# Patient Record
Sex: MALE | Race: WHITE | Employment: PART TIME | URBAN - METROPOLITAN AREA
[De-identification: names, ages, dates, MRNs, and addresses within clinical notes are randomized per-mention and may not be internally consistent; named-entity substitution may affect disease eponyms.]

---

## 2017-03-01 ENCOUNTER — OFFICE VISIT (OUTPATIENT)
Dept: FAMILY MEDICINE CLINIC | Age: 22
End: 2017-03-01

## 2017-03-01 VITALS
WEIGHT: 157.2 LBS | TEMPERATURE: 98 F | OXYGEN SATURATION: 99 % | BODY MASS INDEX: 22.5 KG/M2 | HEIGHT: 70 IN | HEART RATE: 56 BPM | RESPIRATION RATE: 18 BRPM | SYSTOLIC BLOOD PRESSURE: 110 MMHG | DIASTOLIC BLOOD PRESSURE: 66 MMHG

## 2017-03-01 DIAGNOSIS — F32.2 SEVERE SINGLE CURRENT EPISODE OF MAJOR DEPRESSIVE DISORDER, WITHOUT PSYCHOTIC FEATURES (HCC): ICD-10-CM

## 2017-03-01 DIAGNOSIS — R42 DIZZINESS: ICD-10-CM

## 2017-03-01 DIAGNOSIS — H53.2 DOUBLE VISION: ICD-10-CM

## 2017-03-01 DIAGNOSIS — G43.919 INTRACTABLE MIGRAINE WITHOUT STATUS MIGRAINOSUS, UNSPECIFIED MIGRAINE TYPE: Primary | ICD-10-CM

## 2017-03-01 DIAGNOSIS — Z23 ENCOUNTER FOR IMMUNIZATION: ICD-10-CM

## 2017-03-01 DIAGNOSIS — F41.9 ANXIETY: ICD-10-CM

## 2017-03-01 DIAGNOSIS — M25.562 CHRONIC PAIN OF BOTH KNEES: ICD-10-CM

## 2017-03-01 DIAGNOSIS — M25.561 CHRONIC PAIN OF BOTH KNEES: ICD-10-CM

## 2017-03-01 DIAGNOSIS — G89.29 CHRONIC PAIN OF BOTH KNEES: ICD-10-CM

## 2017-03-01 RX ORDER — TOPIRAMATE 50 MG/1
TABLET, FILM COATED ORAL
Qty: 60 TAB | Refills: 5 | Status: SHIPPED | OUTPATIENT
Start: 2017-03-01 | End: 2017-05-03 | Stop reason: SDUPTHER

## 2017-03-01 NOTE — PROGRESS NOTES
Chief Complaint   Patient presents with    Follow Up Chronic Condition     bilat. knees    Headache     c/o headaches x 2 months or more , more recent now, about qod,starts behind eyes and then whole head hurts       Reviewed Record in preparation for visit and have obtained necessary documentation. Identified pt with two pt identifiers (Name @ )    Health Maintenance Due   Topic    HPV AGE 9Y-26Y (1 of 3 - Male 3 Dose Series)    DTaP/Tdap/Td series (6 - Tdap)    INFLUENZA AGE 9 TO ADULT          1. Have you been to the ER, urgent care clinic since your last visit? Hospitalized since your last visit? No    2. Have you seen or consulted any other health care providers outside of the [address] [place] [address] since your last visit? Include any pap smears or colon screening.  No

## 2017-03-01 NOTE — MR AVS SNAPSHOT
Visit Information Date & Time Provider Department Dept. Phone Encounter #  
 3/1/2017  4:15 PM Trever Ray MD [address] [place] [address] [phone] [id number] Follow-up Instructions Return in about 1 month (around 4/1/2017) for depression. Upcoming Health Maintenance Date Due  
 HPV AGE 9Y-34Y (1 of 3 - Male 3 Dose Series) 4/2/2006 DTaP/Tdap/Td series (6 - Tdap) 4/2/2006 Allergies as of 3/1/2017  Review Complete On: 3/1/2017 By: Trever Ray MD  
  
 Severity Noted Reaction Type Reactions Ibuprofen  11/15/2010    Unknown (comments) PT IS NOT ALLERGIC TO IBUPROFEN Soy  03/12/2013    Itching, Nausea and Vomiting Current Immunizations  Reviewed on 3/12/2013 Name Date DTaP 7/7/1999, 3/26/1997, 1995, 1995, 1995 Hep B Vaccine 1/16/1996, 1995, 1995 Hib 3/26/1997, 1995, 1995, 1995 Influenza Vaccine (Quad) PF 3/1/2017, 11/24/2015 Influenza Vaccine PF 12/16/2013 MMR 7/7/1999, 8/6/1996 Meningococcal (MCV4P) Vaccine 3/12/2013  7:19 PM  
 Poliovirus vaccine 3/26/1997, 1995, 1995, 1995 Varicella Virus Vaccine 8/6/1996 Not reviewed this visit You Were Diagnosed With   
  
 Codes Comments Intractable migraine without status migrainosus, unspecified migraine type    -  Primary ICD-10-CM: H85.077 ICD-9-CM: 346.91 Dizziness     ICD-10-CM: K32 ICD-9-CM: 780.4 Double vision     ICD-10-CM: H53.2 ICD-9-CM: 368.2 Severe single current episode of major depressive disorder, without psychotic features ([facility] [address]     ICD-10-CM: F32.2 ICD-9-CM: 296.23 Anxiety     ICD-10-CM: F41.9 ICD-9-CM: 300.00 Chronic pain of both knees     ICD-10-CM: M25.561, M25.562, G89.29 ICD-9-CM: 719.46, 338.29 Encounter for immunization     ICD-10-CM: V34 ICD-9-CM: V03.89 Vitals BP  
  
  
  
  
  
 110/66 (BP 1 Location: Right arm, BP Patient Position: Sitting) Vitals History BMI and BSA Data Body Mass Index Body Surface Area  
 22.56 kg/m 2 1.88 m 2 Preferred Pharmacy Pharmacy Name Phone [facility] [address] [phone] Your Updated Medication List  
  
   
This list is accurate as of: 3/1/17  5:07 PM.  Always use your most recent med list.  
  
  
  
  
 diclofenac 1 % Gel Commonly known as:  VOLTAREN Apply 4 g to affected area four (4) times daily. topiramate 50 mg tablet Commonly known as:  TOPAMAX Wk1: 1/2 tab AM, Wk2: 1/2 tab AM and 1/2 tab PM, Wk3 1 tab AM 1/2 tab PM, Wk 4 1 tab BID Prescriptions Sent to Pharmacy Refills  
 topiramate (TOPAMAX) 50 mg tablet 5 Sig: Wk1: 1/2 tab AM, Wk2: 1/2 tab AM and 1/2 tab PM, Wk3 1 tab AM 1/2 tab PM, Wk 4 1 tab BID Class: Normal  
 Pharmacy: [id number] Ph #: [phone] We Performed the Following INFLUENZA VIRUS VAC QUAD,SPLIT,PRESV FREE SYRINGE 3/> YRS IM [id number] CPT(R)] REFERRAL TO PSYCHOLOGY [SSX62 Custom] Comments:  
 Please evaluate patient for Major depression. Follow-up Instructions Return in about 1 month (around 4/1/2017) for depression. To-Do List   
 03/01/2017 Imaging:  CT HEAD WO CONT Referral Information Referral ID Referred By Referred To  
  
 [id number] [address] [place], [facility] Visits Status Start Date End Date 1 New Request 3/1/17 3/1/18 If your referral has a status of pending review or denied, additional information will be sent to support the outcome of this decision. Introducing [facility] & HEALTH SERVICES! Zaid Laguna introduces [facility] patient portal. Now you can access parts of your medical record, email your doctor's office, and request medication refills online.    
 
1. In your internet browser, go to [email or web address] 2. Click on the First Time User? Click Here link in the Sign In box. You will see the New Member Sign Up page. 3. Enter your [employer] Access Code exactly as it appears below. You will not need to use this code after youve completed the sign-up process. If you do not sign up before the expiration date, you must request a new code. · [employer] Access Code: [email or web address] Expires: 5/30/2017  5:05 PM 
 
4. Enter the last four digits of your Social Security Number (xxxx) and Date of Birth (mm/dd/yyyy) as indicated and click Submit. You will be taken to the next sign-up page. 5. Create a [email or web address].comt ID. This will be your [employer] login ID and cannot be changed, so think of one that is secure and easy to remember. 6. Create a [employer] password. You can change your password at any time. 7. Enter your Password Reset Question and Answer. This can be used at a later time if you forget your password. 8. Enter your e-mail address. You will receive e-mail notification when new information is available in [address] 9. Click Sign Up. You can now view and download portions of your medical record. 10. Click the Download Summary menu link to download a portable copy of your medical information. If you have questions, please visit the Frequently Asked Questions section of the [employer] website. Remember, [employer] is NOT to be used for urgent needs. For medical emergencies, dial 911. Now available from your iPhone and Android! Please provide this summary of care documentation to your next provider. Your primary care clinician is listed as Kirit Cano. If you have any questions after today's visit, please call [phone].

## 2017-03-01 NOTE — PROGRESS NOTES
HISTORY OF PRESENT ILLNESS  Jerad Ronquillo is a 24 y.o. male. Blood pressure 110/66, pulse (!) 56, temperature 98 °F (36.7 °C), temperature source Oral, resp. rate 18, height 5' 10\" (1.778 m), weight 157 lb 3.2 oz (71.3 kg), SpO2 99 %. Body mass index is 22.56 kg/(m^2). Chief Complaint   Patient presents with    Follow Up Chronic Condition     bilat. knees    Headache     c/o headaches x 2 months or more , more recent now, about qod,starts behind eyes and then whole head hurts    Immunization/Injection     flu vaccine      HPI   Jerad Ronquillo 24 y.o. male  presents to the office today with acute complaint of headaches. Bp at office today 110/66. Headaches: Pt complains of intermittent headaches for past two months. Pain first starts behind his eyes and then radiates all over his head. Pt notes onset of headaches usually occur when he is using the computer and occurs about every other day. This is improved from when headaches were occurring daily. Also notes associated photophobia, blurred vision, occasional double vision, and occasional dizziness during his headaches. He takes BC powder for his headache and notes pain resolves after a few hours. Headaches are likely migraine related. I have advised pt to start Topamax and to titrate up to 50 mg BID starting with 25 mg daily for one week, 25 mg BID in week 2, 50 mg AM and 25 mg PM in week 3, and in week 4 50 mg BID. Pt advised to complete CT of head to rule out further abnormality. Pt to notify me if symptoms progress or fail to improve. Major depression: Pt brings up issues with depression. Pt notes increased stress at home due to financial issues at home and is currently looking for a job.  He has little interest in activity nearly every day, feeling down or hopeless nearly everyday, trouble with sleep several days of the week, feeling tired everyday, issues with appetite everyday, feeling bad about himself everyday, trouble concentrating everyday, and his mother has noticed he is moving or speaking more slowly. PHQ-9 score of 23/30 today. He does endorse suicidal ideations in the past, but does not endorse any recently. Pt does not wish to take any medications to treat his depression. He has seen counselor in the past, but notes he had difficulty making new appointments. Will refer pt to psychology (Dr. Jennifer Guerra) for counseling and pt advised to go to the emergency room if he notes any suicidal ideations. Chronic knee pain: Pt notes bilateral knee pain is only onset occasionally when he exercises. He applies Voltaren gel during his episodes of pain with improvement. Health maintenance: Pt has received flu vaccine at office today. 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 Current Outpatient Prescriptions   Medication Sig Dispense Refill    topiramate (TOPAMAX) 50 mg tablet Wk1: 1/2 tab AM, Wk2: 1/2 tab AM and 1/2 tab PM, Wk3 1 tab AM 1/2 tab PM, Wk 4 1 tab BID 60 Tab 5    diclofenac (VOLTAREN) 1 % gel Apply 4 g to affected area four (4) times daily. 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 Negative for malaise/fatigue.    Eyes: Positive for blurred vision (w/ headache), double vision (w/ headache) and photophobia (w/ headache). Respiratory: Negative for shortness of breath. Cardiovascular: Negative for chest pain and leg swelling. Gastrointestinal: Positive for nausea (intermittent with headache). Musculoskeletal: Negative. Neurological: Positive for dizziness (intermittent with headache) and headaches. Psychiatric/Behavioral: Positive for depression. Negative for suicidal ideas. All other systems reviewed and are negative. Physical Exam   Constitutional: He is oriented to person, place, and time. He appears well-developed and well-nourished. HENT:   Head: Normocephalic and atraumatic. Eyes: EOM are normal. Pupils are equal, round, and reactive to light. Neck: Carotid bruit is not present. Cardiovascular: Normal rate, regular rhythm and intact distal pulses. Exam reveals no gallop and no friction rub. Murmur heard. Systolic murmur is present with a grade of 3/6   Pulmonary/Chest: Effort normal and breath sounds normal. No respiratory distress. He has no wheezes. He has no rales. He exhibits no tenderness. Neurological: He is alert and oriented to person, place, and time. He has normal reflexes. Romberg negative, cranial nerves II-XII intact    Psychiatric: He has a normal mood and affect. His behavior is normal. Judgment and thought content normal.   Nursing note and vitals reviewed. ASSESSMENT and PLAN  Carson Wallace was seen today for follow up chronic condition, headache and immunization/injection. Diagnoses and all orders for this visit:    Intractable migraine without status migrainosus, unspecified migraine type  -     CT HEAD WO CONT; Future  -     topiramate (TOPAMAX) 50 mg tablet; Wk1: 1/2 tab AM, Wk2: 1/2 tab AM and 1/2 tab PM, Wk3 1 tab AM 1/2 tab PM, Wk 4 1 tab BID  - Headaches are likely migraine related.  I have advised pt to start Topamax and to titrate up to 50 mg BID starting with 25 mg daily for one week, 25 mg BID in week 2, 50 mg AM and 25 mg PM in week 3, and in week 4 50 mg BID. Pt advised to complete CT of head to rule out further abnormality. Pt to notify me if symptoms progress or fail to improve. Dizziness  -     CT HEAD WO CONT; Future  - See above    Double vision  -     CT HEAD WO CONT; Future  - See above     Severe single current episode of major depressive disorder, without psychotic features ([facility] [address]  -     REFERRAL TO PSYCHOLOGY (Dr. Jennifer Guerra)   - PHQ-9 23/30 today. Will refer pt to psychology (Dr. Jennifer Guerra) for counseling and pt advised to go to the emergency room immediately if he notes any suicidal ideations. Pt will follow up with OV in one month. Anxiety  -     REFERRAL TO PSYCHOLOGY  - See above     Chronic pain of both knees  Pt is managing symptoms with Voltaren gel as needed. Encounter for immunization  -     Influenza virus vaccine (QUADRIVALENT PRES FREE SYRINGE) IM 3 years and older      Follow-up Disposition:  Return in about 1 month (around 4/1/2017) for depression. Medication risks/benefits/costs/interactions/alternatives discussed with patient. Advised patient to call back or return to office if symptoms worsen/change/persist.  If patient cannot reach us or should anything more severe/urgent arise he/she should proceed directly to the nearest emergency department. Discussed expected course/resolution/complications of diagnosis in detail with patient. Patient given a written after visit summary which includes her diagnoses, current medications and vitals. Patient expressed understanding with the diagnosis and plan. Written by stephan Piedra, as dictated by Elena Del Rio M.D.    I have reviewed and agree with the above note and have made corrections where appropriate, Dr. Tato Chopra MD

## 2017-03-09 ENCOUNTER — TELEPHONE (OUTPATIENT)
Dept: FAMILY MEDICINE CLINIC | Age: 22
End: 2017-03-09

## 2017-03-09 DIAGNOSIS — G43.111 INTRACTABLE MIGRAINE WITH AURA WITH STATUS MIGRAINOSUS: Primary | ICD-10-CM

## 2017-03-09 NOTE — TELEPHONE ENCOUNTER
Robert  from Coordination [phone] CT head denied by his insurance company.  Robert Gamez notified patient via VM that they will cancel appointment

## 2017-03-14 NOTE — TELEPHONE ENCOUNTER
[phone] (home)  notified [facility] that Dr Marquis Monterroso want him to be seen by Neurologist given her information for Dr Clearence Sicard. Per [employer] she wants son to have CT scan and they will pay for it out of pocket I gave her  phone number to set up appointment and to let them know that patients paying for it out of pocket.  Randi espinoza

## 2017-04-03 ENCOUNTER — OFFICE VISIT (OUTPATIENT)
Dept: FAMILY MEDICINE CLINIC | Age: 22
End: 2017-04-03

## 2017-04-03 VITALS
HEIGHT: 70 IN | DIASTOLIC BLOOD PRESSURE: 61 MMHG | TEMPERATURE: 98.6 F | SYSTOLIC BLOOD PRESSURE: 122 MMHG | OXYGEN SATURATION: 100 % | WEIGHT: 153.6 LBS | RESPIRATION RATE: 14 BRPM | HEART RATE: 52 BPM | BODY MASS INDEX: 21.99 KG/M2

## 2017-04-03 DIAGNOSIS — G43.919 INTRACTABLE MIGRAINE WITHOUT STATUS MIGRAINOSUS, UNSPECIFIED MIGRAINE TYPE: Primary | ICD-10-CM

## 2017-04-03 DIAGNOSIS — F41.9 ANXIETY: ICD-10-CM

## 2017-04-03 DIAGNOSIS — Z01.818 PRE-OP EXAM: ICD-10-CM

## 2017-04-03 RX ORDER — AMOXICILLIN 500 MG/1
2000 CAPSULE ORAL ONCE
Qty: 4 CAP | Refills: 0 | Status: SHIPPED | OUTPATIENT
Start: 2017-04-03 | End: 2017-04-03

## 2017-04-03 NOTE — MR AVS SNAPSHOT
Visit Information Date & Time Provider Department Dept. Phone Encounter #  
 4/3/2017  4:00 PM Richar Mitchell MD [facility] [phone] [id number] Follow-up Instructions Return in about 1 month (around 5/3/2017) for migraine. Upcoming Health Maintenance Date Due DTaP/Tdap/Td series (6 - Tdap) 4/2/2006 Allergies as of 4/3/2017  Review Complete On: 4/3/2017 By: Richar Mitchell MD  
  
 Severity Noted Reaction Type Reactions Ibuprofen  11/15/2010    Unknown (comments) PT IS NOT ALLERGIC TO IBUPROFEN Soy  03/12/2013    Itching, Nausea and Vomiting Current Immunizations  Reviewed on 3/12/2013 Name Date DTaP 7/7/1999, 3/26/1997, 1995, 1995, 1995 Hep B Vaccine 1/16/1996, 1995, 1995 Hib 3/26/1997, 1995, 1995, 1995 Influenza Vaccine (Quad) PF 3/1/2017, 11/24/2015 Influenza Vaccine PF 12/16/2013 MMR 7/7/1999, 8/6/1996 Meningococcal (MCV4P) Vaccine 3/12/2013  7:19 PM  
 Poliovirus vaccine 3/26/1997, 1995, 1995, 1995 Varicella Virus Vaccine 8/6/1996 Not reviewed this visit You Were Diagnosed With   
  
 Codes Comments Intractable migraine without status migrainosus, unspecified migraine type    -  Primary ICD-10-CM: X49.451 ICD-9-CM: 346.91 Anxiety     ICD-10-CM: F41.9 ICD-9-CM: 300.00 Pre-op exam     ICD-10-CM: S25.840 ICD-9-CM: V72.84 Vitals BP Pulse Temp Resp Height(growth percentile) Weight(growth percentile) 122/61 (BP 1 Location: Left arm, BP Patient Position: Sitting) (!) 52 98.6 °F (37 °C) (Oral) 14 5' 10\" (1.778 m) 153 lb 9.6 oz (69.7 kg) SpO2 BMI Smoking Status 100% 22.04 kg/m2 Never Smoker Vitals History BMI and BSA Data Body Mass Index Body Surface Area 22.04 kg/m 2 1.86 m 2 Preferred Pharmacy Pharmacy Name Phone  [email or web address] - [address] [phone] Your Updated Medication List  
  
   
This list is accurate as of: 4/3/17  5:17 PM.  Always use your most recent med list.  
  
  
  
  
 amoxicillin 500 mg capsule Commonly known as:  AMOXIL Take 4 Caps by mouth once for 1 dose. 30-60 minutes prior to procedure  
  
 diclofenac 1 % Gel Commonly known as:  VOLTAREN Apply 4 g to affected area four (4) times daily. topiramate 50 mg tablet Commonly known as:  TOPAMAX Wk1: 1/2 tab AM, Wk2: 1/2 tab AM and 1/2 tab PM, Wk3 1 tab AM 1/2 tab PM, Wk 4 1 tab BID Prescriptions Sent to Pharmacy Refills  
 amoxicillin (AMOXIL) 500 mg capsule 0 Sig: Take 4 Caps by mouth once for 1 dose. 30-60 minutes prior to procedure Class: Normal  
 Pharmacy: [id number], [address] [place] [address] #: [phone] Route: Oral  
  
We Performed the Following REFERRAL TO NEUROPSYCHOLOGY [GFP01 Custom] Comments:  
 Please evaluate patient for focus/anxiety. Follow-up Instructions Return in about 1 month (around 5/3/2017) for migraine. Referral Information Referral ID Referred By Referred To  
  
 [id number] Leanne Reeder, [place] [facility], [address] [place] [address] Phone: [phone] Fax: [phone] Visits Status Start Date End Date 1 New Request 4/3/17 4/3/18 If your referral has a status of pending review or denied, additional information will be sent to support the outcome of this decision. Introducing [facility] & HEALTH SERVICES! [facility] introduces [employer] patient portal. Now you can access parts of your medical record, email your doctor's office, and request medication refills online. 1. In your internet browser, go to [email or web address] 2. Click on the First Time User? Click Here link in the Sign In box.  You will see the New Member Sign Up page. 3. Enter your [employer] Access Code exactly as it appears below. You will not need to use this code after youve completed the sign-up process. If you do not sign up before the expiration date, you must request a new code. · [employer] Access Code: [email or web address] Expires: 5/30/2017  6:05 PM 
 
4. Enter the last four digits of your Social Security Number (xxxx) and Date of Birth (mm/dd/yyyy) as indicated and click Submit. You will be taken to the next sign-up page. 5. Create a Comply365t ID. This will be your [employer] login ID and cannot be changed, so think of one that is secure and easy to remember. 6. Create a [employer] password. You can change your password at any time. 7. Enter your Password Reset Question and Answer. This can be used at a later time if you forget your password. 8. Enter your e-mail address. You will receive e-mail notification when new information is available in [address] 9. Click Sign Up. You can now view and download portions of your medical record. 10. Click the Download Summary menu link to download a portable copy of your medical information. If you have questions, please visit the Frequently Asked Questions section of the [employer] website. Remember, [employer] is NOT to be used for urgent needs. For medical emergencies, dial 911. Now available from your iPhone and Android! Please provide this summary of care documentation to your next provider. Your primary care clinician is listed as Jeniffer Gutierrez. If you have any questions after today's visit, please call [phone].

## 2017-04-03 NOTE — PROGRESS NOTES
HISTORY OF PRESENT ILLNESS  Reggie Clay is a 25 y.o. male. Blood pressure 122/61, pulse (!) 52, temperature 98.6 °F (37 °C), temperature source Oral, resp. rate 14, height 5' 10\" (1.778 m), weight 153 lb 9.6 oz (69.7 kg), SpO2 100 %. Body mass index is 22.04 kg/(m^2). Chief Complaint   Patient presents with    Migraine      HPI   Reggie Clay 25 y.o. male  presents to the office today for follow up on migraines. Bp at office today 122/61. Migraines: Recall pt was seen at office on 03/01/17 and advised to start Topamax for migraine prophylaxis. Pt denies any side effects with the medication. Pt notes his headaches have improved, but has not completely resolved. He notes about four headaches in past month since last visit. Pt did not take any additional medications when he had a headache and just rested and drank more water during his episodes. Discussed with pt we can consider increasing dosage of Topamax if migraines progress or fail to improve. Anxiety: Pt notes increased stress in life due to family issues. 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 Pt also notes issues with concentration with past history of difficulty in completing projects he has started. I have advised pt to complete neuropsychological testing for his concentration issues. Will refer him to Dr. Yehuda Menendez (neuropsychology). Current Outpatient Prescriptions   Medication Sig Dispense Refill    amoxicillin (AMOXIL) 500 mg capsule Take 4 Caps by mouth once for 1 dose. 30-60 minutes prior to procedure 4 Cap 0    topiramate (TOPAMAX) 50 mg tablet Wk1: 1/2 tab AM, Wk2: 1/2 tab AM and 1/2 tab PM, Wk3 1 tab AM 1/2 tab PM, Wk 4 1 tab BID 60 Tab 5    diclofenac (VOLTAREN) 1 % gel Apply 4 g to affected area four (4) times daily.  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 Negative for malaise/fatigue. Eyes: Negative for blurred vision. Respiratory: Negative for shortness of breath. Cardiovascular: Negative for chest pain and leg swelling. Musculoskeletal: Negative. Neurological: Negative. Negative for dizziness and headaches. Psychiatric/Behavioral: The patient is nervous/anxious. + focus issues   All other systems reviewed and are negative. Physical Exam   Constitutional: He is oriented to person, place, and time. He appears well-developed and well-nourished. HENT:   Head: Normocephalic and atraumatic. Neck: Carotid bruit is not present. Cardiovascular: Normal rate, regular rhythm, normal heart sounds and intact distal pulses. Exam reveals no gallop and no friction rub. No murmur heard. Pulmonary/Chest: Effort normal and breath sounds normal. No respiratory distress. He has no wheezes. He has no rales. He exhibits no tenderness. Neurological: He is alert and oriented to person, place, and time. Psychiatric: He has a normal mood and affect. His behavior is normal. Judgment and thought content normal.   Nursing note and vitals reviewed. ASSESSMENT and PLAN  Anil Madrid was seen today for migraine.     Diagnoses and all orders for this visit:    Intractable migraine without status migrainosus, unspecified migraine type  Pt have had four migraines in past month since starting on Topamax. Discussed with pt we can increase dosage of Topamx if his symptoms progress or fail to improve. Anxiety  -     REFERRAL TO NEUROPSYCHOLOGY (Dr. Nirmala Card)  - Will refer to Dr. Amira Jackson to complete neuropsychological testing     Pre-op exam  -     amoxicillin (AMOXIL) 500 mg capsule; Take 4 Caps by mouth once for 1 dose. 30-60 minutes prior to procedure  - Advised pt to take Amoxil prior to his dental procedure. Follow-up Disposition:  Return in about 1 month (around 5/3/2017) for migraine. Medication risks/benefits/costs/interactions/alternatives discussed with patient. Advised patient to call back or return to office if symptoms worsen/change/persist.  If patient cannot reach us or should anything more severe/urgent arise he/she should proceed directly to the nearest emergency department. Discussed expected course/resolution/complications of diagnosis in detail with patient. Patient given a written after visit summary which includes her diagnoses, current medications and vitals. Patient expressed understanding with the diagnosis and plan. Written by stephan Anthony, as dictated by Deyvi Art M.D.    I have reviewed and agree with the above note and have made corrections where appropriate, Dr. Katerina Cottrell MD

## 2017-04-03 NOTE — PROGRESS NOTES
Chief Complaint   Patient presents with    Migraine       1. Have you been to the ER, urgent care clinic since your last visit? Hospitalized since your last visit? No    2. Have you seen or consulted any other health care providers outside of the [address] [place] [address] since your last visit? Include any pap smears or colon screening. No    Body mass index is 22.04 kg/(m^2).

## 2017-05-03 ENCOUNTER — OFFICE VISIT (OUTPATIENT)
Dept: FAMILY MEDICINE CLINIC | Age: 22
End: 2017-05-03

## 2017-05-03 VITALS
SYSTOLIC BLOOD PRESSURE: 111 MMHG | HEART RATE: 42 BPM | RESPIRATION RATE: 15 BRPM | WEIGHT: 150.6 LBS | BODY MASS INDEX: 21.56 KG/M2 | HEIGHT: 70 IN | OXYGEN SATURATION: 100 % | DIASTOLIC BLOOD PRESSURE: 63 MMHG | TEMPERATURE: 98.6 F

## 2017-05-03 DIAGNOSIS — G43.919 INTRACTABLE MIGRAINE WITHOUT STATUS MIGRAINOSUS, UNSPECIFIED MIGRAINE TYPE: ICD-10-CM

## 2017-05-03 DIAGNOSIS — F41.9 ANXIETY: Primary | ICD-10-CM

## 2017-05-03 RX ORDER — TOPIRAMATE 50 MG/1
50 TABLET, FILM COATED ORAL 2 TIMES DAILY
Qty: 60 TAB | Refills: 5
Start: 2017-05-03 | End: 2018-04-19 | Stop reason: SDUPTHER

## 2017-05-03 NOTE — PROGRESS NOTES
Chief Complaint   Patient presents with    Anxiety       1. Have you been to the ER, urgent care clinic since your last visit? Hospitalized since your last visit? No    2. Have you seen or consulted any other health care providers outside of the [facility] since your last visit? Include any pap smears or colon screening. No    Body mass index is 21.61 kg/(m^2).

## 2017-05-03 NOTE — MR AVS SNAPSHOT
Visit Information Date & Time Provider Department Dept. Phone Encounter #  
 5/3/2017  3:45 PM Juliet Cotto MD [address] [place] [address] [phone] [id number] Follow-up Instructions Return in about 2 months (around 7/3/2017) for migraine. Upcoming Health Maintenance Date Due DTaP/Tdap/Td series (6 - Tdap) 4/2/2006 INFLUENZA AGE 9 TO ADULT 8/1/2017 Allergies as of 5/3/2017  Review Complete On: 5/3/2017 By: Juliet Cotto MD  
  
 Severity Noted Reaction Type Reactions Ibuprofen  11/15/2010    Unknown (comments) PT IS NOT ALLERGIC TO IBUPROFEN Soy  03/12/2013    Itching, Nausea and Vomiting Current Immunizations  Reviewed on 3/12/2013 Name Date DTaP 7/7/1999, 3/26/1997, 1995, 1995, 1995 Hep B Vaccine 1/16/1996, 1995, 1995 Hib 3/26/1997, 1995, 1995, 1995 Influenza Vaccine (Quad) PF 3/1/2017, 11/24/2015 Influenza Vaccine PF 12/16/2013 MMR 7/7/1999, 8/6/1996 Meningococcal (MCV4P) Vaccine 3/12/2013  7:19 PM  
 Poliovirus vaccine 3/26/1997, 1995, 1995, 1995 Varicella Virus Vaccine 8/6/1996 Not reviewed this visit You Were Diagnosed With   
  
 Codes Comments Anxiety    -  Primary ICD-10-CM: F41.9 ICD-9-CM: 300.00 Intractable migraine without status migrainosus, unspecified migraine type     ICD-10-CM: G43.919 ICD-9-CM: 346.91 Vitals BP Pulse Temp Resp Height(growth percentile) Weight(growth percentile) 111/63 (BP 1 Location: Left arm, BP Patient Position: Sitting) (!) 42 98.6 °F (37 °C) (Oral) 15 5' 10\" (1.778 m) 150 lb 9.6 oz (68.3 kg) SpO2 BMI Smoking Status 100% 21.61 kg/m2 Never Smoker Vitals History BMI and BSA Data Body Mass Index Body Surface Area  
 21.61 kg/m 2 1.84 m 2 Preferred Pharmacy Pharmacy Name Phone [facility] [address], [address] [place] [address] [phone] Your Updated Medication List  
  
   
This list is accurate as of: 5/3/17  4:36 PM.  Always use your most recent med list.  
  
  
  
  
 diclofenac 1 % Gel Commonly known as:  VOLTAREN Apply 4 g to affected area four (4) times daily. topiramate 50 mg tablet Commonly known as:  TOPAMAX Take 1 Tab by mouth two (2) times a day. Indications: MIGRAINE PREVENTION Follow-up Instructions Return in about 2 months (around 7/3/2017) for migraine. Introducing [facility] SERVICES! Abrahan Sibley introduces [employer] patient portal. Now you can access parts of your medical record, email your doctor's office, and request medication refills online. 1. In your internet browser, go to [email or web address] 2. Click on the First Time User? Click Here link in the Sign In box. You will see the New Member Sign Up page. 3. Enter your [employer] Access Code exactly as it appears below. You will not need to use this code after youve completed the sign-up process. If you do not sign up before the expiration date, you must request a new code. · [employer] Access Code: [email or web address] Expires: 5/30/2017  6:05 PM 
 
4. Enter the last four digits of your Social Security Number (xxxx) and Date of Birth (mm/dd/yyyy) as indicated and click Submit. You will be taken to the next sign-up page. 5. Create a [employer] ID. This will be your [employer] login ID and cannot be changed, so think of one that is secure and easy to remember. 6. Create a [employer] password. You can change your password at any time. 7. Enter your Password Reset Question and Answer. This can be used at a later time if you forget your password. 8. Enter your e-mail address. You will receive e-mail notification when new information is available in [address] 9. Click Sign Up. You can now view and download portions of your medical record.  
10. Click the Download Summary menu link to download a portable copy of your medical information. If you have questions, please visit the Frequently Asked Questions section of the [employer] website. Remember, [employer] is NOT to be used for urgent needs. For medical emergencies, dial 911. Now available from your iPhone and Android! Please provide this summary of care documentation to your next provider. Your primary care clinician is listed as Abby Castillo. If you have any questions after today's visit, please call [phone].

## 2017-05-03 NOTE — PROGRESS NOTES
HISTORY OF PRESENT ILLNESS  So Britt is a 25 y.o. male. Blood pressure 111/63, pulse (!) 42, temperature 98.6 °F (37 °C), temperature source Oral, resp. rate 15, height 5' 10\" (1.778 m), weight 150 lb 9.6 oz (68.3 kg), SpO2 100 %. Body mass index is 21.61 kg/(m^2). Chief Complaint   Patient presents with    Anxiety      HPI   So Britt 25 y.o. male  presents to the office today for a follow up on anxiety. Bp at office today 111/63. Anxiety: Pt notes his anxiety and stress is mostly unchanged from last visit on 04/05/17. He notes his grandmother has moved in with his family and he helps with her care in the evenings. However, pt states he is working on changing his mindset and finding a better paying job. Headaches: Pt notes his headaches are well managed on Topamax 50 mg daily. When the Topamax was refilled, he started the titration schedule again and states he will titrate up to 50 mg BID by the end of this week. He did forget to take the Topamax in two instances and notes onset of headaches when he forgot to take it. The headache was all over his head with associated blurred vision. Pt had to lay down, rest, and take the Topamax and his symptoms did improve. He still felt a \"throbbing pain\" in his head a few hours after taking the medication. Health maintenance: Pt notes episode of left-sided chest pain about one week ago. He described the pain as a \"cramp\" and states it was under his left upper rib region. He attributes the pain to his sleeping position and states the pain resolved after a few hours. Denies any associated SOB. Current Outpatient Prescriptions   Medication Sig Dispense Refill    topiramate (TOPAMAX) 50 mg tablet Take 1 Tab by mouth two (2) times a day. Indications: MIGRAINE PREVENTION 60 Tab 5    diclofenac (VOLTAREN) 1 % gel Apply 4 g to affected area four (4) times daily.  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 Negative for malaise/fatigue. Eyes: Negative for blurred vision. Respiratory: Negative for shortness of breath. Cardiovascular: Negative for chest pain and leg swelling. Musculoskeletal: Negative. Neurological: Negative. Negative for dizziness and headaches. All other systems reviewed and are negative. Physical Exam   Constitutional: He is oriented to person, place, and time. He appears well-developed and well-nourished. HENT:   Head: Normocephalic and atraumatic. Neck: Carotid bruit is not present. Cardiovascular: Normal rate, regular rhythm, normal heart sounds and intact distal pulses. Exam reveals no gallop and no friction rub. No murmur heard. Pulmonary/Chest: Effort normal and breath sounds normal. No respiratory distress. He has no wheezes. He has no rales. He exhibits no tenderness. Neurological: He is alert and oriented to person, place, and time. Psychiatric: He has a normal mood and affect. His behavior is normal. Judgment and thought content normal.   Nursing note and vitals reviewed. ASSESSMENT and PLAN  Cyndi Higgins was seen today for anxiety. Diagnoses and all orders for this visit:    Anxiety  Stable. Pt states his anxiety is well managed without any medication use.      Intractable migraine without status migrainosus, unspecified migraine type  -     topiramate (TOPAMAX) 50 mg tablet; Take 1 Tab by mouth two (2) times a day. Indications: MIGRAINE PREVENTION  - Stable, continue current regimen      Follow-up Disposition:  Return in about 2 months (around 7/3/2017) for migraine. Medication risks/benefits/costs/interactions/alternatives discussed with patient. Advised patient to call back or return to office if symptoms worsen/change/persist.  If patient cannot reach us or should anything more severe/urgent arise he/she should proceed directly to the nearest emergency department. Discussed expected course/resolution/complications of diagnosis in detail with patient. Patient given a written after visit summary which includes her diagnoses, current medications and vitals. Patient expressed understanding with the diagnosis and plan. Written by stephan Koenig, as dictated by Ania Trujillo M.D.    I have reviewed and agree with the above note and have made corrections where appropriate, Dr. Pallavi Cadet MD

## 2017-07-11 ENCOUNTER — TELEPHONE (OUTPATIENT)
Dept: FAMILY MEDICINE CLINIC | Age: 22
End: 2017-07-11

## 2017-07-11 NOTE — TELEPHONE ENCOUNTER
[phone] Left message to call me back to reschedule appointment that was cancelled 7/3/2017 due to Dr Kiran Georges being out.

## 2018-04-19 DIAGNOSIS — G43.919 INTRACTABLE MIGRAINE WITHOUT STATUS MIGRAINOSUS, UNSPECIFIED MIGRAINE TYPE: ICD-10-CM

## 2018-04-19 NOTE — TELEPHONE ENCOUNTER
Pharmacy is requesting a refill on the medication  Quantity: 60  .  Requested Prescriptions     Pending Prescriptions Disp Refills    topiramate (TOPAMAX) 50 mg tablet 60 Tab 5     Sig: Take 1 Tab by mouth two (2) times a day. Indications: SERVANDO Irizarry 89 on file verified  Last refill: 5/3/2017   Last seen : May 03, 2017

## 2018-04-19 NOTE — TELEPHONE ENCOUNTER
LOV 5/13/2017    [phone] (home)     Notified patient via via VM your pharmacy has sent us a request to refill your medicine. will send refill request to PCP however she/he is due for follow up and he/she needs to call us to make appointment for follow up.  Please call our office at [phone] to schedule appointment

## 2018-04-23 RX ORDER — TOPIRAMATE 50 MG/1
50 TABLET, FILM COATED ORAL 2 TIMES DAILY
Qty: 60 TAB | Refills: 0 | Status: SHIPPED | OUTPATIENT
Start: 2018-04-23 | End: 2018-07-08 | Stop reason: SDUPTHER

## 2018-05-16 ENCOUNTER — OFFICE VISIT (OUTPATIENT)
Dept: FAMILY MEDICINE CLINIC | Age: 23
End: 2018-05-16

## 2018-05-16 VITALS
TEMPERATURE: 98 F | HEIGHT: 70 IN | HEART RATE: 52 BPM | SYSTOLIC BLOOD PRESSURE: 121 MMHG | OXYGEN SATURATION: 94 % | DIASTOLIC BLOOD PRESSURE: 69 MMHG | WEIGHT: 151 LBS | RESPIRATION RATE: 18 BRPM | BODY MASS INDEX: 21.62 KG/M2

## 2018-05-16 DIAGNOSIS — Z00.00 ROUTINE GENERAL MEDICAL EXAMINATION AT A HEALTH CARE FACILITY: Primary | ICD-10-CM

## 2018-05-16 NOTE — MR AVS SNAPSHOT
[address] 
[phone] Patient: Raine Avila MRN: [id number] BHL:[id number] Visit Information Date & Time Provider Department Dept. Phone Encounter #  
 5/16/2018  3:15 PM Nhung Farias  [facility] [phone] [id number] Follow-up Instructions Return in about 1 year (around 5/16/2019) for physical exm. Upcoming Health Maintenance Date Due DTaP/Tdap/Td series (6 - Tdap) 4/2/2006 Influenza Age 5 to Adult 8/1/2018 Allergies as of 5/16/2018  Review Complete On: 5/16/2018 By: Nhung Farias MD  
  
 Severity Noted Reaction Type Reactions Ibuprofen  11/15/2010    Unknown (comments) PT IS NOT ALLERGIC TO IBUPROFEN Soy  03/12/2013    Itching, Nausea and Vomiting Current Immunizations  Reviewed on 3/12/2013 Name Date DTaP 7/7/1999, 3/26/1997, 1995, 1995, 1995 Hep B Vaccine 1/16/1996, 1995, 1995 Hib 3/26/1997, 1995, 1995, 1995 Influenza Vaccine (Quad) PF 3/1/2017, 11/24/2015 Influenza Vaccine PF 12/16/2013 MMR 7/7/1999, 8/6/1996 Meningococcal (MCV4P) Vaccine 3/12/2013  7:19 PM  
 Poliovirus vaccine 3/26/1997, 1995, 1995, 1995 Tdap 5/16/2018 Varicella Virus Vaccine 8/6/1996 Not reviewed this visit You Were Diagnosed With   
  
 Codes Comments Routine general medical examination at a health care facility    -  Primary ICD-10-CM: Z00.00 ICD-9-CM: V70.0 Vitals BP Pulse Temp Resp Height(growth percentile) Weight(growth percentile) 121/69 (BP 1 Location: Left arm, BP Patient Position: Sitting) (!) 52 98 °F (36.7 °C) (Oral) 18 5' 10\" (1.778 m) 151 lb (68.5 kg) SpO2 BMI Smoking Status 94% 21.67 kg/m2 Never Smoker BMI and BSA Data Body Mass Index Body Surface Area  
 21.67 kg/m 2 1.84 m 2 Preferred Pharmacy Pharmacy Name Phone [facility] [address] [place] [address] [phone] Your Updated Medication List  
  
   
This list is accurate as of 5/16/18  3:38 PM.  Always use your most recent med list.  
  
  
  
  
 diclofenac 1 % Gel Commonly known as:  VOLTAREN Apply 4 g to affected area four (4) times daily. topiramate 50 mg tablet Commonly known as:  TOPAMAX Take 1 Tab by mouth two (2) times a day. Indications: MIGRAINE PREVENTION We Performed the Following CBC WITH AUTOMATED DIFF [89885 CPT(R)] LIPID PANEL [34947 CPT(R)] METABOLIC PANEL, COMPREHENSIVE [05587 CPT(R)] T4, FREE V8417366 CPT(R)] TETANUS, DIPHTHERIA TOXOIDS AND ACELLULAR PERTUSSIS VACCINE (TDAP), IN INDIVIDS. >=7, IM [id number] CPT(R)] TSH 3RD GENERATION [11645 CPT(R)] URINALYSIS W/MICROSCOPIC [76474 CPT(R)] VITAMIN D, 25 HYDROXY [id number] CPT(R)] Follow-up Instructions Return in about 1 year (around 5/16/2019) for physical exm. Patient Instructions Well Visit, Ages 25 to 48: Care Instructions Your Care Instructions Physical exams can help you stay healthy. Your doctor has checked your overall health and may have suggested ways to take good care of yourself. He or she also may have recommended tests. At home, you can help prevent illness with healthy eating, regular exercise, and other steps. Follow-up care is a key part of your treatment and safety. Be sure to make and go to all appointments, and call your doctor if you are having problems. It's also a good idea to know your test results and keep a list of the medicines you take. How can you care for yourself at home? · Reach and stay at a healthy weight. This will lower your risk for many problems, such as obesity, diabetes, heart disease, and high blood pressure. · Get at least 30 minutes of physical activity on most days of the week. Walking is a good choice. You also may want to do other activities, such as running, swimming, cycling, or playing tennis or team sports. Discuss any changes in your exercise program with your doctor. · Do not smoke or allow others to smoke around you. If you need help quitting, talk to your doctor about stop-smoking programs and medicines. These can increase your chances of quitting for good. · Talk to your doctor about whether you have any risk factors for sexually transmitted infections (STIs). Having one sex partner (who does not have STIs and does not have sex with anyone else) is a good way to avoid these infections. · Use birth control if you do not want to have children at this time. Talk with your doctor about the choices available and what might be best for you. · Protect your skin from too much sun. When you're outdoors from 10 a.m. to 4 p.m., stay in the shade or cover up with clothing and a hat with a wide brim. Wear sunglasses that block UV rays. Even when it's cloudy, put broad-spectrum sunscreen (SPF 30 or higher) on any exposed skin. · See a dentist one or two times a year for checkups and to have your teeth cleaned. · Wear a seat belt in the car. · Drink alcohol in moderation, if at all. That means no more than 2 drinks a day for men and 1 drink a day for women. Follow your doctor's advice about when to have certain tests. These tests can spot problems early. For everyone · Cholesterol. Have the fat (cholesterol) in your blood tested after age 21. Your doctor will tell you how often to have this done based on your age, family history, or other things that can increase your risk for heart disease. · Blood pressure. Have your blood pressure checked during a routine doctor visit. Your doctor will tell you how often to check your blood pressure based on your age, your blood pressure results, and other factors. · Vision.  Talk with your doctor about how often to have a glaucoma test. 
 · Diabetes. Ask your doctor whether you should have tests for diabetes. · Colon cancer. Have a test for colon cancer at age 48. You may have one of several tests. If you are younger than 48, you may need a test earlier if you have any risk factors. Risk factors include whether you already had a precancerous polyp removed from your colon or whether your parent, brother, sister, or child has had colon cancer. For women · Breast exam and mammogram. Talk to your doctor about when you should have a clinical breast exam and a mammogram. Medical experts differ on whether and how often women under 50 should have these tests. Your doctor can help you decide what is right for you. · Pap test and pelvic exam. Begin Pap tests at age 24. A Pap test is the best way to find cervical cancer. The test often is part of a pelvic exam. Ask how often to have this test. 
· Tests for sexually transmitted infections (STIs). Ask whether you should have tests for STIs. You may be at risk if you have sex with more than one person, especially if your partners do not wear condoms. For men · Tests for sexually transmitted infections (STIs). Ask whether you should have tests for STIs. You may be at risk if you have sex with more than one person, especially if you do not wear a condom. · Testicular cancer exam. Ask your doctor whether you should check your testicles regularly. · Prostate exam. Talk to your doctor about whether you should have a blood test (called a PSA test) for prostate cancer. Experts differ on whether and when men should have this test. Some experts suggest it if you are older than 39 and are -American or have a father or brother who got prostate cancer when he was younger than 72. When should you call for help? Watch closely for changes in your health, and be sure to contact your doctor if you have any problems or symptoms that concern you. Where can you learn more? Go to http://allan-luzma.info/. Enter P072 in the search box to learn more about \"Well Visit, Ages 25 to 48: Care Instructions. \" Current as of: May 12, 2017 Content Version: 11.4 © 0822-0151 Healthwise, Incorporated. Care instructions adapted under license by [employer] (which disclaims liability or warranty for this information). If you have questions about a medical condition or this instruction, always ask your healthcare professional. [address] any warranty or liability for your use of this information. Introducing [facility]! Marissa lPunkett introduces [employer] patient portal. Now you can access parts of your medical record, email your doctor's office, and request medication refills online. 1. In your internet browser, go to [email or web address] 2. Click on the First Time User? Click Here link in the Sign In box. You will see the New Member Sign Up page. 3. Enter your [employer] Access Code exactly as it appears below. You will not need to use this code after youve completed the sign-up process. If you do not sign up before the expiration date, you must request a new code. · [employer] Access Code: [email or web address] Expires: 8/14/2018  3:38 PM 
 
4. Enter the last four digits of your Social Security Number (xxxx) and Date of Birth (mm/dd/yyyy) as indicated and click Submit. You will be taken to the next sign-up page. 5. Create a [employer] ID. This will be your [employer] login ID and cannot be changed, so think of one that is secure and easy to remember. 6. Create a [employer] password. You can change your password at any time. 7. Enter your Password Reset Question and Answer. This can be used at a later time if you forget your password. 8. Enter your e-mail address. You will receive e-mail notification when new information is available in [address]. 9. Click Sign Up.  You can now view and download portions of your medical record. 10. Click the Download Summary menu link to download a portable copy of your medical information. If you have questions, please visit the Frequently Asked Questions section of the [employer] website. Remember, [employer] is NOT to be used for urgent needs. For medical emergencies, dial 911. Now available from your iPhone and Android! Please provide this summary of care documentation to your next provider. Your primary care clinician is listed as Kelechi Yun. If you have any questions after today's visit, please call [phone].

## 2018-05-16 NOTE — PROGRESS NOTES
HISTORY OF PRESENT ILLNESS  Osmel Rosales is a 21 y.o. male. Blood pressure 121/69, pulse (!) 52, temperature 98 °F (36.7 °C), temperature source Oral, resp. rate 18, height 5' 10\" (1.778 m), weight 151 lb (68.5 kg), SpO2 94 %. Body mass index is 21.67 kg/(m^2). Chief Complaint   Patient presents with    Complete Physical     pt fasting        HPI  Osmel Rosales 21 y.o. male  presents to the office today for a complete physical.    Health maintenance: Pt states that 1 week ago he woke up with a cramping feeling in his chest that lasted half of the day. Pt reports that his headaches have decreased in frequency and intensity. He notes that he needs to exercise on a regular schedule. Current Outpatient Prescriptions   Medication Sig Dispense Refill    topiramate (TOPAMAX) 50 mg tablet Take 1 Tab by mouth two (2) times a day. Indications: MIGRAINE PREVENTION 60 Tab 0    diclofenac (VOLTAREN) 1 % gel Apply 4 g to affected area four (4) times daily. 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 HENT: Negative for congestion, ear discharge, ear pain, hearing loss, nosebleeds, sore throat and tinnitus.     Eyes: Negative for blurred vision, double vision, photophobia, pain, discharge and redness. Respiratory: Negative for cough, hemoptysis, sputum production, shortness of breath, wheezing and stridor. Cardiovascular: Negative for chest pain, palpitations, orthopnea, claudication, leg swelling and PND. Gastrointestinal: Negative for abdominal pain, blood in stool, constipation, diarrhea, heartburn, melena, nausea and vomiting. Genitourinary: Negative for dysuria, flank pain, frequency, hematuria and urgency. Musculoskeletal: Negative for back pain, falls, joint pain, myalgias and neck pain. Skin: Negative for itching and rash. Neurological: Negative for dizziness, tingling, tremors, sensory change, speech change, focal weakness, seizures, loss of consciousness, weakness and headaches. Endo/Heme/Allergies: Negative for environmental allergies and polydipsia. Does not bruise/bleed easily. Psychiatric/Behavioral: Negative for depression, hallucinations, memory loss, substance abuse and suicidal ideas. The patient is not nervous/anxious and does not have insomnia. All other systems reviewed and are negative. Physical Exam   Constitutional: He is oriented to person, place, and time. He appears well-developed and well-nourished. No distress. HENT:   Head: Normocephalic and atraumatic. Right Ear: External ear normal.   Left Ear: External ear normal.   Nose: Nose normal.   Mouth/Throat: Oropharynx is clear and moist. No oropharyngeal exudate. Eyes: Conjunctivae and EOM are normal. Pupils are equal, round, and reactive to light. Right eye exhibits no discharge. Left eye exhibits no discharge. No scleral icterus. Neck: Normal range of motion. Neck supple. No JVD present. No tracheal deviation present. No thyromegaly present. Cardiovascular: Normal rate, regular rhythm, normal heart sounds and intact distal pulses. Exam reveals no gallop and no friction rub. No murmur heard.   Pulmonary/Chest: Effort normal and breath sounds normal. No stridor. He has no wheezes. He has no rales. He exhibits no tenderness. Abdominal: Soft. Bowel sounds are normal. He exhibits no distension and no mass. There is no tenderness. There is no rebound and no guarding. Musculoskeletal: Normal range of motion. He exhibits no edema or tenderness. Lymphadenopathy:     He has no cervical adenopathy. Neurological: He is alert and oriented to person, place, and time. He has normal reflexes. No cranial nerve deficit. He exhibits normal muscle tone. Coordination normal.   Skin: Skin is warm and dry. No rash noted. He is not diaphoretic. No erythema. No pallor. Psychiatric: He has a normal mood and affect. His behavior is normal. Judgment and thought content normal.   Nursing note and vitals reviewed. ASSESSMENT and PLAN  Diagnoses and all orders for this visit:    1. Routine general medical examination at a health care facility  -     LIPID PANEL  -     METABOLIC PANEL, COMPREHENSIVE  -     VITAMIN D, 25 HYDROXY  -     CBC WITH AUTOMATED DIFF  -     TETANUS, DIPHTHERIA TOXOIDS AND ACELLULAR PERTUSSIS VACCINE (TDAP), IN INDIVIDS. >=7, IM  -     TSH 3RD GENERATION  -     T4, FREE  -     URINALYSIS W/MICROSCOPIC      Follow-up Disposition:  Return in about 1 year (around 5/16/2019) for physical exm. Medication risks/benefits/costs/interactions/alternatives discussed with patient. Advised patient to call back or return to office if symptoms worsen/change/persist.  If patient cannot reach us or should anything more severe/urgent arise he/she should proceed directly to the nearest emergency department. Discussed expected course/resolution/complications of diagnosis in detail with patient. Patient given a written after visit summary which includes her diagnoses, current medications and vitals. Patient expressed understanding with the diagnosis and plan. Written by stephan Charlton, as dictated by Abhi Prather M.D.   I have reviewed and agree with the above note and have made corrections where appropriate, Dr. Afshan Borja MD

## 2018-05-16 NOTE — PATIENT INSTRUCTIONS

## 2018-05-16 NOTE — PROGRESS NOTES
Chief Complaint   Patient presents with    Complete Physical     pt fasting     1. Have you been to the ER, urgent care clinic since your last visit? Hospitalized since your last visit? No    2. Have you seen or consulted any other health care providers outside of the [address] [place] [address] since your last visit? Include any pap smears or colon screening.  No

## 2018-05-17 LAB
25(OH)D3+25(OH)D2 SERPL-MCNC: 22.4 NG/ML (ref 30–100)
ALBUMIN SERPL-MCNC: 4.3 G/DL (ref 3.5–5.5)
ALBUMIN/GLOB SERPL: 1.8 {RATIO} (ref 1.2–2.2)
ALP SERPL-CCNC: 50 IU/L (ref 39–117)
ALT SERPL-CCNC: 15 IU/L (ref 0–44)
APPEARANCE UR: CLEAR
AST SERPL-CCNC: 19 IU/L (ref 0–40)
BACTERIA #/AREA URNS HPF: NORMAL /[HPF]
BASOPHILS # BLD AUTO: 0.1 X10E3/UL (ref 0–0.2)
BASOPHILS NFR BLD AUTO: 1 %
BILIRUB SERPL-MCNC: 0.4 MG/DL (ref 0–1.2)
BILIRUB UR QL STRIP: NEGATIVE
BUN SERPL-MCNC: 9 MG/DL (ref 6–20)
BUN/CREAT SERPL: 13 (ref 9–20)
CALCIUM SERPL-MCNC: 9.5 MG/DL (ref 8.7–10.2)
CASTS URNS QL MICRO: NORMAL /LPF
CHLORIDE SERPL-SCNC: 107 MMOL/L (ref 96–106)
CHOLEST SERPL-MCNC: 128 MG/DL (ref 100–199)
CO2 SERPL-SCNC: 22 MMOL/L (ref 18–29)
COLOR UR: YELLOW
CREAT SERPL-MCNC: 0.7 MG/DL (ref 0.76–1.27)
EOSINOPHIL # BLD AUTO: 0.1 X10E3/UL (ref 0–0.4)
EOSINOPHIL NFR BLD AUTO: 4 %
EPI CELLS #/AREA URNS HPF: NORMAL /HPF
ERYTHROCYTE [DISTWIDTH] IN BLOOD BY AUTOMATED COUNT: 14.4 % (ref 12.3–15.4)
GFR SERPLBLD CREATININE-BSD FMLA CKD-EPI: 133 ML/MIN/1.73
GFR SERPLBLD CREATININE-BSD FMLA CKD-EPI: 154 ML/MIN/1.73
GLOBULIN SER CALC-MCNC: 2.4 G/DL (ref 1.5–4.5)
GLUCOSE SERPL-MCNC: 87 MG/DL (ref 65–99)
GLUCOSE UR QL: NEGATIVE
HCT VFR BLD AUTO: 38.6 % (ref 37.5–51)
HDLC SERPL-MCNC: 49 MG/DL
HGB BLD-MCNC: 12.4 G/DL (ref 13–17.7)
HGB UR QL STRIP: NEGATIVE
IMM GRANULOCYTES # BLD: 0 X10E3/UL (ref 0–0.1)
IMM GRANULOCYTES NFR BLD: 0 %
INTERPRETATION, 910389: NORMAL
KETONES UR QL STRIP: ABNORMAL
LDLC SERPL CALC-MCNC: 68 MG/DL (ref 0–99)
LEUKOCYTE ESTERASE UR QL STRIP: NEGATIVE
LYMPHOCYTES # BLD AUTO: 1.5 X10E3/UL (ref 0.7–3.1)
LYMPHOCYTES NFR BLD AUTO: 38 %
MCH RBC QN AUTO: 29 PG (ref 26.6–33)
MCHC RBC AUTO-ENTMCNC: 32.1 G/DL (ref 31.5–35.7)
MCV RBC AUTO: 90 FL (ref 79–97)
MICRO URNS: ABNORMAL
MICRO URNS: ABNORMAL
MONOCYTES # BLD AUTO: 0.3 X10E3/UL (ref 0.1–0.9)
MONOCYTES NFR BLD AUTO: 6 %
MUCOUS THREADS URNS QL MICRO: PRESENT
NEUTROPHILS # BLD AUTO: 2 X10E3/UL (ref 1.4–7)
NEUTROPHILS NFR BLD AUTO: 51 %
NITRITE UR QL STRIP: NEGATIVE
PH UR STRIP: 5.5 [PH] (ref 5–7.5)
PLATELET # BLD AUTO: 263 X10E3/UL (ref 150–379)
POTASSIUM SERPL-SCNC: 4.1 MMOL/L (ref 3.5–5.2)
PROT SERPL-MCNC: 6.7 G/DL (ref 6–8.5)
PROT UR QL STRIP: ABNORMAL
RBC # BLD AUTO: 4.28 X10E6/UL (ref 4.14–5.8)
RBC #/AREA URNS HPF: NORMAL /HPF
SODIUM SERPL-SCNC: 140 MMOL/L (ref 134–144)
SP GR UR: >=1.03 (ref 1–1.03)
T4 FREE SERPL-MCNC: 0.94 NG/DL (ref 0.82–1.77)
TRIGL SERPL-MCNC: 55 MG/DL (ref 0–149)
TSH SERPL DL<=0.005 MIU/L-ACNC: 1.91 UIU/ML (ref 0.45–4.5)
UROBILINOGEN UR STRIP-MCNC: 0.2 MG/DL (ref 0.2–1)
VLDLC SERPL CALC-MCNC: 11 MG/DL (ref 5–40)
WBC # BLD AUTO: 3.9 X10E3/UL (ref 3.4–10.8)
WBC #/AREA URNS HPF: NORMAL /HPF

## 2018-05-29 NOTE — PROGRESS NOTES
We need to recheck hg/hct in 2 weeks. Place order and inform patient. vitamin D levels are low. Please take vitamin D 2000 international units. ( available over the counter)  daily and get 15 minutes of sunlight. Please have vitamin D levels rechecked in 3 months. Rest of the labs are stable.

## 2018-05-31 ENCOUNTER — TELEPHONE (OUTPATIENT)
Dept: FAMILY MEDICINE CLINIC | Age: 23
End: 2018-05-31

## 2018-05-31 NOTE — PROGRESS NOTES
Outbound call to pt's mother (HIPPA verified) she states pt is sleeping at the moment by agrees to receive results, made her aware of result/recommendations below, understanding voiced

## 2018-05-31 NOTE — TELEPHONE ENCOUNTER
Patient is returning call.  He is also requesting an update on a Physical form he left with Dr Pantera Fox on his last visit  Best call back : [phone]  Last seen : May 16, 2018

## 2018-05-31 NOTE — TELEPHONE ENCOUNTER
----- Message from Nirmala Kim sent at 5/31/2018  5:39 PM EDT -----  Regarding: Dr. Melanie Mendoza Telephone  Pt is requesting a call back in regards to picking up complete physical exam forms.  Best contact number: [phone]

## 2018-06-01 ENCOUNTER — TELEPHONE (OUTPATIENT)
Dept: FAMILY MEDICINE CLINIC | Age: 23
End: 2018-06-01

## 2018-06-01 NOTE — TELEPHONE ENCOUNTER
[phone] spoke to patient notified him he needs to sign the form per patient the fax number he gave us his Mom fax and he will get the form from her and he will sign it.      Faxed form [phone]

## 2018-06-01 NOTE — TELEPHONE ENCOUNTER
Please call patient. [phone].   He needs his physical form faxed over to [employer] [phone]

## 2018-06-29 ENCOUNTER — OFFICE VISIT (OUTPATIENT)
Dept: FAMILY MEDICINE CLINIC | Age: 23
End: 2018-06-29

## 2018-06-29 VITALS
OXYGEN SATURATION: 97 % | BODY MASS INDEX: 22.13 KG/M2 | WEIGHT: 154.6 LBS | HEART RATE: 62 BPM | TEMPERATURE: 98.2 F | DIASTOLIC BLOOD PRESSURE: 57 MMHG | RESPIRATION RATE: 18 BRPM | SYSTOLIC BLOOD PRESSURE: 106 MMHG | HEIGHT: 70 IN

## 2018-06-29 DIAGNOSIS — R31.9 HEMATURIA, UNSPECIFIED TYPE: Primary | ICD-10-CM

## 2018-06-29 DIAGNOSIS — R36.9 BLOODY DRAINAGE FROM PENIS: ICD-10-CM

## 2018-06-29 NOTE — PROGRESS NOTES
[facility] Note      Subjective:     Chief Complaint   Patient presents with    Blood in Urine     Patient states for the past week blood in urine with some abdominal pain. Patient did go to [facility] and dx was kidney stones, and was told to follow up for the kidney stones. Sintia Martin is a 21y.o. year old male who presents for evaluation of the following:      Blood in Urine:   Onset 1 week ago  [facility]  - given strainer for urine  - not given any medictoon  Symptoms resolved 3 days ago  Taking topamax for migraines for 1 year  - Noted blood at tip of penis  Denies back pain throughout this course, new sexual partner, dysuria, penile trauma. Review of Systems   Pertinent positives and negative per HPI. All other systems  reviewed are negative for a Comprehensive ROS (10+). 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 Social History Main Topics    Smoking status: Never Smoker    Smokeless tobacco: Never Used    Alcohol use No    Drug use: No    Sexual activity: No     Other Topics Concern    Not on file     Social History Narrative       Current Outpatient Prescriptions   Medication Sig    topiramate (TOPAMAX) 50 mg tablet Take 1 Tab by mouth two (2) times a day. Indications: MIGRAINE PREVENTION    diclofenac (VOLTAREN) 1 % gel Apply 4 g to affected area four (4) times daily. No current facility-administered medications for this visit.           Objective:     Vitals:    06/29/18 1000   BP: 106/57   Pulse: 62   Resp: 18   Temp: 98.2 °F (36.8 °C)   TempSrc: Oral   SpO2: 97%   Weight: 154 lb 9.6 oz (70.1 kg)   Height: 5' 10\" (1.778 m)       Physical Examination:  General: Alert, cooperative, no distress, appears stated age. Eyes: Conjunctivae clear. PERRL, EOMs intact. Ears: Normal external ear canals both ears. Nose: Nares normal.   Mouth/Throat: Lips, mucosa, and tongue normal.  Neck: Supple, symmetrical, trachea midline, no adenopathy. Back: Symmetric, no curvature. ROM normal. No CVA tenderness. Lungs: Clear to auscultation bilaterally. Normal inspiratory and expiratory ratio. Heart: Regular rate and rhythm, S1, S2 normal, no murmur, click, rub or gallop. Abdomen: Soft, non-tender. Bowel sounds normal. No masses or organomegaly. Extremities: Extremities normal, atraumatic, no cyanosis or edema. Pulses: 2+ and symmetric all extremities. Skin: Skin color, texture, turgor normal. No rashes or lesions on exposed skin. Lymph nodes: Cervical, supraclavicular nodes normal.  Neurologic: CNII-XII intact. Strength 5/5 grossly. Sensation and reflexes normal throughout.     Office Visit on 05/16/2018   Component Date Value Ref Range Status    Cholesterol, total 05/16/2018 128  100 - 199 mg/dL Final    Triglyceride 05/16/2018 55  0 - 149 mg/dL Final    HDL Cholesterol 05/16/2018 49  >39 mg/dL Final    VLDL, calculated 05/16/2018 11  5 - 40 mg/dL Final    LDL, calculated 05/16/2018 68  0 - 99 mg/dL Final    Glucose 05/16/2018 87  65 - 99 mg/dL Final    BUN 05/16/2018 9  6 - 20 mg/dL Final    Creatinine 05/16/2018 0.70* 0.76 - 1.27 mg/dL Final    GFR est non-AA 05/16/2018 133  >59 mL/min/1.73 Final    GFR est AA 05/16/2018 154  >59 mL/min/1.73 Final    BUN/Creatinine ratio 05/16/2018 13  9 - 20 Final    Sodium 05/16/2018 140  134 - 144 mmol/L Final    Potassium 05/16/2018 4.1  3.5 - 5.2 mmol/L Final    Chloride 05/16/2018 107* 96 - 106 mmol/L Final    CO2 05/16/2018 22  18 - 29 mmol/L Final    Calcium 05/16/2018 9.5  8.7 - 10.2 mg/dL Final    Protein, total 05/16/2018 6.7  6.0 - 8.5 g/dL Final    Albumin 05/16/2018 4.3 3.5 - 5.5 g/dL Final    GLOBULIN, TOTAL 05/16/2018 2.4  1.5 - 4.5 g/dL Final    A-G Ratio 05/16/2018 1.8  1.2 - 2.2 Final    Bilirubin, total 05/16/2018 0.4  0.0 - 1.2 mg/dL Final    Alk. phosphatase 05/16/2018 50  39 - 117 IU/L Final    AST (SGOT) 05/16/2018 19  0 - 40 IU/L Final    ALT (SGPT) 05/16/2018 15  0 - 44 IU/L Final    VITAMIN D, 25-HYDROXY 05/16/2018 22.4* 30.0 - 100.0 ng/mL Final    Comment: Vitamin D deficiency has been defined by the [facility] practice guideline as a  level of serum 25-OH vitamin D less than 20 ng/mL (1,2). The Endocrine Society went on to further define vitamin D  insufficiency as a level between 21 and 29 ng/mL (2). 1. IOM ([place] of Medicine). 2010. Dietary reference     intakes for calcium and D. [facility]: The     Adaptly. 2. Brando MF, Alejandro NC, Riki KING, et al.     Evaluation, treatment, and prevention of vitamin D     deficiency: an Endocrine Society clinical practice     guideline. JCEM. 2011 Jul; 96(7):1911-30.  WBC 05/16/2018 3.9  3.4 - 10.8 x10E3/uL Final    RBC 05/16/2018 4.28  4.14 - 5.80 x10E6/uL Final    HGB 05/16/2018 12.4* 13.0 - 17.7 g/dL Final    HCT 05/16/2018 38.6  37.5 - 51.0 % Final    MCV 05/16/2018 90  79 - 97 fL Final    MCH 05/16/2018 29.0  26.6 - 33.0 pg Final    MCHC 05/16/2018 32.1  31.5 - 35.7 g/dL Final    RDW 05/16/2018 14.4  12.3 - 15.4 % Final    PLATELET 91/68/2471 397  150 - 379 x10E3/uL Final    NEUTROPHILS 05/16/2018 51  Not Estab. % Final    Lymphocytes 05/16/2018 38  Not Estab. % Final    MONOCYTES 05/16/2018 6  Not Estab. % Final    EOSINOPHILS 05/16/2018 4  Not Estab. % Final    BASOPHILS 05/16/2018 1  Not Estab. % Final    ABS. NEUTROPHILS 05/16/2018 2.0  1.4 - 7.0 x10E3/uL Final    Abs Lymphocytes 05/16/2018 1.5  0.7 - 3.1 x10E3/uL Final    ABS. MONOCYTES 05/16/2018 0.3  0.1 - 0.9 x10E3/uL Final    ABS.  EOSINOPHILS 05/16/2018 0.1 0.0 - 0.4 x10E3/uL Final    ABS. BASOPHILS 05/16/2018 0.1  0.0 - 0.2 x10E3/uL Final    IMMATURE GRANULOCYTES 05/16/2018 0  Not Estab. % Final    ABS. IMM. GRANS. 05/16/2018 0.0  0.0 - 0.1 x10E3/uL Final    TSH 05/16/2018 1.910  0.450 - 4.500 uIU/mL Final    T4, Free 05/16/2018 0.94  0.82 - 1.77 ng/dL Final    Specific Gravity 05/16/2018      >=1.030* 1.005 - 1.030 Final    pH (UA) 05/16/2018 5.5  5.0 - 7.5 Final    Color 05/16/2018 Yellow  Yellow Final    Appearance 05/16/2018 Clear  Clear Final    Leukocyte Esterase 05/16/2018 Negative  Negative Final    Protein 05/16/2018 Trace  Negative/Trace Final    Glucose 05/16/2018 Negative  Negative Final    Ketone 05/16/2018 Trace* Negative Final    Blood 05/16/2018 Negative  Negative Final    Bilirubin 05/16/2018 Negative  Negative Final    Urobilinogen 05/16/2018 0.2  0.2 - 1.0 mg/dL Final    Nitrites 05/16/2018 Negative  Negative Final    Microscopic Examination 05/16/2018 Comment   Final    Microscopic follows if indicated.  Microscopic exam 05/16/2018 See additional order   Final    Microscopic was indicated and was performed.  WBC 05/16/2018 0-5  0 - 5 /hpf Final    RBC 05/16/2018 0-2  0 - 2 /hpf Final    Epithelial cells 05/16/2018 0-10  0 - 10 /hpf Final    Casts 05/16/2018 None seen  None seen /lpf Final    Mucus 05/16/2018 Present  Not Estab. Final    Bacteria 05/16/2018 Few  None seen/Few Final    INTERPRETATION 05/16/2018 Note   Final    Supplemental report is available. Assessment/ Plan:   Diagnoses and all orders for this visit:    1. Hematuria, unspecified type  -     REFERRAL TO UROLOGY    2. Bloody drainage from penis  -     REFERRAL TO UROLOGY    Follow up with urology given recent history of blood at urethral metus. Resolution is reassuring but unclear etiology. Not likely kidney stone without dysuria or back pain or stone on imaging. [facility] to clarify history/ initial assessment.  Discussed kidney stone not common side effect of Topamax but offered change in medication is patient desires, which he declined. I have discussed the diagnosis with the patient and the intended plan as seen in the above orders. The patient has received an after-visit summary and questions were answered concerning future plans. I have discussed medication side effects and warnings with the patient as well. Follow-up Disposition:  Return if symptoms worsen or fail to improve, for Follow Up.       Signed,    Ynes Crenshaw MD  6/29/2018

## 2018-06-29 NOTE — PATIENT INSTRUCTIONS

## 2018-06-29 NOTE — MR AVS SNAPSHOT
Rober Miller 
 
 
 [address] 
[phone] Patient: Dorene Forde MRN: [id number] HLB:[birth year] Visit Information Date & Time Provider Department Dept. Phone Encounter #  
 6/29/2018 10:00 AM Bryce Waters  [place] [phone] [id number] Follow-up Instructions Return if symptoms worsen or fail to improve, for Follow Up. Upcoming Health Maintenance Date Due Influenza Age 5 to Adult 8/1/2018 DTaP/Tdap/Td series (7 - Td) 5/16/2028 Allergies as of 6/29/2018  Review Complete On: 6/29/2018 By: Gagan Hamilton LPN Severity Noted Reaction Type Reactions Ibuprofen  11/15/2010    Unknown (comments) PT IS NOT ALLERGIC TO IBUPROFEN Soy  03/12/2013    Itching, Nausea and Vomiting Current Immunizations  Reviewed on 6/1/2018 Name Date DTaP 7/7/1999, 3/26/1997, 1995, 1995, 1995 Hep B Vaccine 1/16/1996, 1995, 1995 Hib 3/26/1997, 1995, 1995, 1995 Influenza Vaccine (Quad) PF 3/1/2017, 11/24/2015 Influenza Vaccine PF 12/16/2013 MMR 7/7/1999, 8/6/1996 Meningococcal (MCV4P) Vaccine 3/12/2013  7:19 PM  
 Poliovirus vaccine 3/26/1997, 1995, 1995, 1995 Tdap 5/16/2018 Varicella Virus Vaccine 8/6/1996 Not reviewed this visit You Were Diagnosed With   
  
 Codes Comments Hematuria, unspecified type    -  Primary ICD-10-CM: R31.9 ICD-9-CM: 599.70 Bloody drainage from penis     ICD-10-CM: R36.9 ICD-9-CM: 216. 7 Vitals BP Pulse Temp Resp Height(growth percentile) Weight(growth percentile) 106/57 (BP 1 Location: Left arm, BP Patient Position: Sitting) 62 98.2 °F (36.8 °C) (Oral) 18 5' 10\" (1.778 m) 154 lb 9.6 oz (70.1 kg) SpO2 BMI Smoking Status 97% 22.18 kg/m2 Never Smoker BMI and BSA Data  Body Mass Index Body Surface Area  
 22.18 kg/m 2 1.86 m 2  
  
  
 Preferred Pharmacy Pharmacy Name Phone [facility] - [address] [place] [address] [address], [address] [facility] [phone] Your Updated Medication List  
  
   
This list is accurate as of 6/29/18 10:26 AM.  Always use your most recent med list.  
  
  
  
  
 diclofenac 1 % Gel Commonly known as:  VOLTAREN Apply 4 g to affected area four (4) times daily. topiramate 50 mg tablet Commonly known as:  TOPAMAX Take 1 Tab by mouth two (2) times a day. Indications: MIGRAINE PREVENTION We Performed the Following REFERRAL TO UROLOGY [AEQ188 Custom] Comments:  
 Episode of blood in urine and blood at urethral meatus. Follow-up Instructions Return if symptoms worsen or fail to improve, for Follow Up. Referral Information Referral ID Referred By Referred To  
  
 [id number] [facility] [address]. [address]   
   [place], [address] Visits Status Start Date End Date 1 New Request 6/29/18 6/29/19 If your referral has a status of pending review or denied, additional information will be sent to support the outcome of this decision. Patient Instructions Blood in the Urine: Care Instructions Your Care Instructions Blood in the urine, or hematuria, may make the urine look red, brown, or pink. There may be blood every time you urinate or just from time to time. You cannot always see blood in the urine, but it will show up in a urine test. 
Blood in the urine may be serious. It should always be checked by a doctor. Your doctor may recommend more tests, including an X-ray, a CT scan, or a cystoscopy (which lets a doctor look inside the urethra and bladder). Blood in the urine can be a sign of another problem. Common causes are bladder infections and kidney stones. An injury to your groin or your genital area can also cause bleeding in the urinary tract.  Very hard exercise-such as running a marathon-can cause blood in the urine. Blood in the urine can also be a sign of kidney disease or cancer in the bladder or kidney. Many cases of blood in the urine are caused by a harmless condition that runs in families. This is called benign familial hematuria. It does not need any treatment. Sometimes your urine may look red or brown even though it does not contain blood. For example, not getting enough fluids (dehydration), taking certain medicines, or having a liver problem can change the color of your urine. Eating foods such as beets, rhubarb, or blackberries or foods with red food coloring can make your urine look red or pink. Follow-up care is a key part of your treatment and safety. Be sure to make and go to all appointments, and call your doctor if you are having problems. It's also a good idea to know your test results and keep a list of the medicines you take. When should you call for help? Call your doctor now or seek immediate medical care if: 
· You have symptoms of a urinary infection. For example: ¨ You have pus in your urine. ¨ You have pain in your back just below your rib cage. This is called flank pain. ¨ You have a fever, chills, or body aches. ¨ It hurts to urinate. ¨ You have groin or belly pain. · You have more blood in your urine. Watch closely for changes in your health, and be sure to contact your doctor if: 
· You have new urination problems. · You do not get better as expected. Where can you learn more? Go to http://allan-luzam.info/. Enter E547 in the search box to learn more about \"Blood in the Urine: Care Instructions. \" Current as of: May 12, 2017 Content Version: 11.4 © 4424-2547 [employer]. Care instructions adapted under license by [employer] (which disclaims liability or warranty for this information).  If you have questions about a medical condition or this instruction, always ask your healthcare professional. [place] [address] any warranty or liability for your use of this information. Introducing [facility]! [employer] introduces [employer] patient portal. Now you can access parts of your medical record, email your doctor's office, and request medication refills online. 1. In your internet browser, go to [email or web address] 2. Click on the First Time User? Click Here link in the Sign In box. You will see the New Member Sign Up page. 3. Enter your [employer] Access Code exactly as it appears below. You will not need to use this code after youve completed the sign-up process. If you do not sign up before the expiration date, you must request a new code. · [employer] Access Code: [email or web address] Expires: 8/14/2018  3:38 PM 
 
4. Enter the last four digits of your Social Security Number (xxxx) and Date of Birth (mm/dd/yyyy) as indicated and click Submit. You will be taken to the next sign-up page. 5. Create a [employer] ID. This will be your [employer] login ID and cannot be changed, so think of one that is secure and easy to remember. 6. Create a [employer] password. You can change your password at any time. 7. Enter your Password Reset Question and Answer. This can be used at a later time if you forget your password. 8. Enter your e-mail address. You will receive e-mail notification when new information is available in [address] 9. Click Sign Up. You can now view and download portions of your medical record. 10. Click the Download Summary menu link to download a portable copy of your medical information. If you have questions, please visit the Frequently Asked Questions section of the [employer] website. Remember, [employer] is NOT to be used for urgent needs. For medical emergencies, dial 911. Now available from your iPhone and Android! Please provide this summary of care documentation to your next provider. Your primary care clinician is listed as Allyn Knight. If you have any questions after today's visit, please call [phone].

## 2018-06-29 NOTE — PROGRESS NOTES
Chief Complaint   Patient presents with    Blood in Urine     Patient states for the past week blood in urine with some abdominal pain. Patient did go to [facility] and dx was kidney stones, and was told to follow up for the kidney stones. Patient still having the abdominal pain, and slight pain on urination. 1. Have you been to the ER, urgent care clinic since your last visit? Hospitalized since your last visit? Yes When: [facility], kidney stones    2. Have you seen or consulted any other health care providers outside of the [address] [place] [address] since your last visit? Include any pap smears or colon screening.  No

## 2018-07-08 DIAGNOSIS — G43.919 INTRACTABLE MIGRAINE WITHOUT STATUS MIGRAINOSUS, UNSPECIFIED MIGRAINE TYPE: ICD-10-CM

## 2018-07-11 RX ORDER — TOPIRAMATE 50 MG/1
TABLET, FILM COATED ORAL
Qty: 60 TAB | Refills: 0 | Status: SHIPPED | OUTPATIENT
Start: 2018-07-11 | End: 2019-08-21 | Stop reason: SDUPTHER

## 2019-07-19 ENCOUNTER — TELEPHONE (OUTPATIENT)
Dept: FAMILY MEDICINE CLINIC | Age: 24
End: 2019-07-19

## 2019-07-19 NOTE — TELEPHONE ENCOUNTER
Called mother Digna Began about letter she needs. She states he has congenital heart dz. He has a new job now for 3 weeks. Since he is the new person has to work outside at [employer] taking [employer]. States he gets bad headaches, sob, feels like passing out. I informed her that Dr. Tia Sommer is off til Monday and we will check with him then. She states will be fine.

## 2019-07-19 NOTE — TELEPHONE ENCOUNTER
Alfonzo Contreras (pts mother) is calling requesting to get a doctors note for the patient. Alfonzo Contreras states that the pt has a new job which he has to work outside. Alfonzochioma Contreras states that she is concerned about the patient working outside in the heat because of the pts jigar, Alfonzo Contreras stated that the pt can work inside, needs the doctor note to have that done.         Best callback:  [phone]      LOV:  Friday, June 29, 2018

## 2019-07-19 NOTE — LETTER
7/22/2019 1:16 PM 
 
Mr. Chas Syed 
[address] [address] To Whom It May Concern: 
 
Chas Syed is currently under the care of SERV[facility] Will [facility]. Patient needs to work inside environment due to his Congenital heart disease. If you have any question don't hesitate to call us back Sincerely, Amrik Davis MD

## 2019-08-21 ENCOUNTER — OFFICE VISIT (OUTPATIENT)
Dept: FAMILY MEDICINE CLINIC | Age: 24
End: 2019-08-21

## 2019-08-21 VITALS
RESPIRATION RATE: 18 BRPM | HEIGHT: 70 IN | WEIGHT: 152.2 LBS | SYSTOLIC BLOOD PRESSURE: 115 MMHG | HEART RATE: 55 BPM | TEMPERATURE: 97.8 F | BODY MASS INDEX: 21.79 KG/M2 | OXYGEN SATURATION: 100 % | DIASTOLIC BLOOD PRESSURE: 64 MMHG

## 2019-08-21 DIAGNOSIS — N34.2 URETHRITIS: ICD-10-CM

## 2019-08-21 DIAGNOSIS — R07.9 CHEST PAIN, UNSPECIFIED TYPE: ICD-10-CM

## 2019-08-21 DIAGNOSIS — G43.919 INTRACTABLE MIGRAINE WITHOUT STATUS MIGRAINOSUS, UNSPECIFIED MIGRAINE TYPE: ICD-10-CM

## 2019-08-21 DIAGNOSIS — Z23 ENCOUNTER FOR IMMUNIZATION: ICD-10-CM

## 2019-08-21 DIAGNOSIS — R31.9 HEMATURIA, UNSPECIFIED TYPE: Primary | ICD-10-CM

## 2019-08-21 LAB
BILIRUB UR QL STRIP: NEGATIVE
GLUCOSE UR-MCNC: NEGATIVE MG/DL
KETONES P FAST UR STRIP-MCNC: NEGATIVE MG/DL
PH UR STRIP: 8.5 [PH] (ref 4.6–8)
PROT UR QL STRIP: NEGATIVE
SP GR UR STRIP: 1.02 (ref 1–1.03)
UA UROBILINOGEN AMB POC: ABNORMAL (ref 0.2–1)
URINALYSIS CLARITY POC: CLEAR
URINALYSIS COLOR POC: YELLOW
URINE BLOOD POC: ABNORMAL
URINE LEUKOCYTES POC: NEGATIVE
URINE NITRITES POC: NEGATIVE

## 2019-08-21 RX ORDER — TOPIRAMATE 50 MG/1
TABLET, FILM COATED ORAL
Qty: 60 TAB | Refills: 0 | Status: SHIPPED | OUTPATIENT
Start: 2019-08-21 | End: 2019-09-17 | Stop reason: SDUPTHER

## 2019-08-21 RX ORDER — CIPROFLOXACIN 500 MG/1
500 TABLET ORAL 2 TIMES DAILY
Qty: 20 TAB | Refills: 0 | Status: SHIPPED | OUTPATIENT
Start: 2019-08-21 | End: 2019-08-31

## 2019-08-21 NOTE — PROGRESS NOTES
History of Present Illness  Kandi Okeefe is a 25 y.o. male who presents to the office today for acute complaint of hematuria and dysuria since yesterday. Pt reports acute pain when urinating yesterday, as well as gross hematuria. This has improved somewhat today, but sx persist. Pt reports that he has had similar sx in the past, for which he has seen urology. He reports that he was told that this was d/t scar tissue in the urethra. He denies penile discharge, lesions, testicular pain. HA: Pt reports that he has not been taking topomax for several months since his last prescription ran out. He reports occasional headaches, though these are less frequent and severe than previously. He currently works retail at [employer], and feels that this sometimes exacerbates HA. CP: He endorses 2 recent repisodes of CP while at work, which resolve in approximately 30min with rest and hydration. He describes CP as a pressure which radiates to the back and shoulder. He feels that these sx may also be r/t his lack of current exercise routine. /64 (BP 1 Location: Right arm, BP Patient Position: Sitting)   Pulse (!) 55   Temp 97.8 °F (36.6 °C) (Oral)   Resp 18   Ht 5' 10\" (1.778 m)   Wt 152 lb 3.2 oz (69 kg)   SpO2 100%   BMI 21.84 kg/m²     Current Outpatient Medications   Medication Sig Dispense Refill    topiramate (TOPAMAX) 50 mg tablet TAKE 1 TABLET BY MOUTH TWICE A DAY FOR MIGRAINE PREVENTION 60 Tab 0    ciprofloxacin HCl (CIPRO) 500 mg tablet Take 1 Tab by mouth two (2) times a day for 10 days. 20 Tab 0    diclofenac (VOLTAREN) 1 % gel Apply 4 g to affected area four (4) times daily.  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 HENT: Negative for hearing loss and tinnitus. Eyes: Negative for blurred vision and double vision. Respiratory: Negative for shortness of breath. Cardiovascular: Positive for chest pain. Negative for palpitations. Gastrointestinal: Negative for nausea and vomiting. Genitourinary: Positive for dysuria and hematuria. Negative for frequency. Musculoskeletal: Negative for back pain and falls. Skin: Negative for itching and rash. Neurological: Positive for headaches. Negative for dizziness. Psychiatric/Behavioral: Negative for depression. The patient is not nervous/anxious. Physical Exam   Constitutional: He is oriented to person, place, and time and well-developed, well-nourished, and in no distress. HENT:   Head: Normocephalic and atraumatic. Right Ear: External ear normal.   Left Ear: External ear normal.   Nose: Nose normal.   Mouth/Throat: Oropharynx is clear and moist.   Eyes: Conjunctivae and EOM are normal.   Neck: Normal range of motion. Neck supple. Cardiovascular: Normal rate, regular rhythm, normal heart sounds and intact distal pulses. Pulmonary/Chest: Effort normal and breath sounds normal.   Abdominal: Soft. Bowel sounds are normal.   Genitourinary: Penis normal. No discharge found. Musculoskeletal: Normal range of motion. Neurological: He is alert and oriented to person, place, and time. Skin: Skin is warm and dry.    Psychiatric: Mood, affect and judgment normal.   Nursing note and vitals reviewed. Diagnoses and all orders for this visit:    1. Hematuria, unspecified type  -     AMB POC URINALYSIS DIP STICK MANUAL W/O MICRO  -     CULTURE, URINE  -     METABOLIC PANEL, COMPREHENSIVE  -     CBC WITH AUTOMATED DIFF  -     REFERRAL TO UROLOGY    2. Encounter for immunization  -     INFLUENZA VIRUS VAC QUAD,SPLIT,PRESV FREE SYRINGE IM    3. Intractable migraine without status migrainosus, unspecified migraine type  -     topiramate (TOPAMAX) 50 mg tablet; TAKE 1 TABLET BY MOUTH TWICE A DAY FOR MIGRAINE PREVENTION    4. Chest pain, unspecified type  -     AMB POC EKG ROUTINE W/ 12 LEADS, INTER & REP    5. Urethritis  -     ciprofloxacin HCl (CIPRO) 500 mg tablet; Take 1 Tab by mouth two (2) times a day for 10 days. 1. Hematuria  POC UA showed microscopic hematuria. Ordered CBC, CMP, urine culture. Provided referral to urology. 2. Encounter for immunization  Pr given influenza vaccine in office. 3. Intractable migraine   Refilled topomax 50mg. 4. Chest pain  POC EKG ordered. 5. Urethritis   Prescribed cipro 500mg BID for 10 days. Follow-up and Dispositions    · Return if symptoms worsen or fail to improve, for blood in urine. Medication risks/benefits/costs/interactions/alternatives discussed with patient. Advised patient to call back or return to office if symptoms worsen/change/persist.  If patient cannot reach us or should anything more severe/urgent arise he should proceed directly to the nearest emergency department. Discussed expected course/resolution/complications of diagnosis in detail with patient. Patient given a written after visit summary which includes his diagnoses, current medications and vitals. Patient expressed understanding with the diagnosis and plan.      Written by Karla Nathan, as dictated by Mile Shah M.D.     1:14 PM - 1:40 PM     Total time spent with the patient was 32 minutes, greater than 50% of time spent counseling patient.

## 2019-08-21 NOTE — PATIENT INSTRUCTIONS

## 2019-08-21 NOTE — PROGRESS NOTES
Chief Complaint   Patient presents with    Blood in Urine     noticed blood in urine yesterday , also painful to urinat       Reviewed Record in preparation for visit and have obtained necessary documentation. Identified pt with two pt identifiers (Name @ )    Health Maintenance Due   Topic    Influenza Age 5 to Adult          1. Have you been to the ER, urgent care clinic since your last visit? Hospitalized since your last visit? Went to urgent care  for fx in right hand. 2. Have you seen or consulted any other health care providers outside of the [address] [place] [address] since your last visit? Include any pap smears or colon screening.  no

## 2019-08-23 LAB — BACTERIA UR CULT: NO GROWTH

## 2019-08-27 NOTE — PROGRESS NOTES
Spoke to patient,  verified. Reviewed results, patient expressed understanding.  Gave patient information for urologist.

## 2019-09-17 DIAGNOSIS — G43.919 INTRACTABLE MIGRAINE WITHOUT STATUS MIGRAINOSUS, UNSPECIFIED MIGRAINE TYPE: ICD-10-CM

## 2019-09-20 RX ORDER — TOPIRAMATE 50 MG/1
TABLET, FILM COATED ORAL
Qty: 60 TAB | Refills: 1 | Status: SHIPPED | OUTPATIENT
Start: 2019-09-20 | End: 2019-10-04 | Stop reason: SDUPTHER

## 2019-10-02 ENCOUNTER — OFFICE VISIT (OUTPATIENT)
Dept: FAMILY MEDICINE CLINIC | Age: 24
End: 2019-10-02

## 2019-10-02 VITALS
BODY MASS INDEX: 22.16 KG/M2 | HEART RATE: 47 BPM | TEMPERATURE: 98.8 F | HEIGHT: 70 IN | OXYGEN SATURATION: 98 % | SYSTOLIC BLOOD PRESSURE: 122 MMHG | DIASTOLIC BLOOD PRESSURE: 70 MMHG | WEIGHT: 154.8 LBS | RESPIRATION RATE: 18 BRPM

## 2019-10-02 DIAGNOSIS — F41.9 ANXIETY: ICD-10-CM

## 2019-10-02 DIAGNOSIS — Q20.5 TRANSPOSITION OF GREAT VESSELS WITH VENTRICULAR INVERSION: ICD-10-CM

## 2019-10-02 DIAGNOSIS — Z00.00 PHYSICAL EXAM: Primary | ICD-10-CM

## 2019-10-02 DIAGNOSIS — R07.89 CHEST DISCOMFORT: ICD-10-CM

## 2019-10-02 NOTE — PROGRESS NOTES
Chief Complaint   Patient presents with    Complete Physical       1. Have you been to the ER, urgent care clinic since your last visit? Hospitalized since your last visit? No    2. Have you seen or consulted any other health care providers outside of the [address] [place] [address] since your last visit? Include any pap smears or colon screening.  Patient saw urologist Dr. Martinez Spotted on 9/17/19

## 2019-10-02 NOTE — PATIENT INSTRUCTIONS

## 2019-10-02 NOTE — PROGRESS NOTES
HPI  Beatris Vazquez 25 y.o. male  presents to the office with his mother today for CPE. Blood pressure 122/70, pulse (!) 47, temperature 98.8 °F (37.1 °C), temperature source Oral, resp. rate 18, height 5' 10\" (1.778 m), weight 154 lb 12.8 oz (70.2 kg), SpO2 98 %. Body mass index is 22.21 kg/m². Chief Complaint   Patient presents with    Complete Physical      Pt's mother expresses concerns about pt experiencing sx's such as migraines, CP, numbness of left side and tingling fingers; she thinks that pt has not been honest with her about the severity and frequency of his sx's. 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 Pt describes the numbness at the time as \"all circulations were cut off\". Pt was aware of possible nerve-related adverse effects of Diclofenac and decided to discontinue the medication. Pt admits that he was very anxious about the sx's at the time. Pt was seen by cardiologist in the past for arm numbness but was told that it was most likely due to wrong sleeping position. Pt has not visited cardiology for over a year, and inquires about referral for a new cardiologist.     Chest discomfort: Pt also endorses experiencing chest discomfort when lifting boxes at work, but it was also the last time that he has experienced any CP. Pt has not been exercising. Anxiety: Pt admits that he was depressed and \"deeply unmotivated\" after finishing college, but he is becoming more aware that he needs to work on improving his mental condition. Pt's mother wants pt to speak to professional.       Current Outpatient Medications   Medication Sig Dispense Refill    topiramate (TOPAMAX) 50 mg tablet TAKE 1 TABLET BY MOUTH TWICE A DAY FOR MIGRAINE PREVENTION 60 Tab 1    diclofenac (VOLTAREN) 1 % gel Apply 4 g to affected area four (4) times daily.  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 HENT: Negative for hearing loss and tinnitus. Eyes: Negative for blurred vision and double vision. Respiratory: Negative for cough and shortness of breath. Cardiovascular: Negative for chest pain and palpitations. Gastrointestinal: Negative for nausea and vomiting. Genitourinary: Negative for dysuria and frequency. Musculoskeletal: Negative for back pain and falls. Skin: Negative for itching and rash. Neurological: Negative for dizziness, loss of consciousness and headaches. Psychiatric/Behavioral: Negative for depression. The patient is nervous/anxious. Physical Exam   Constitutional: He is oriented to person, place, and time. He appears well-developed and well-nourished. HENT:   Head: Normocephalic and atraumatic. Right Ear: External ear normal.   Left Ear: External ear normal.   Nose: Nose normal.   Mouth/Throat: Oropharynx is clear and moist.   Eyes: Conjunctivae and EOM are normal.   Neck: Normal range of motion. Neck supple. Cardiovascular: Normal rate, regular rhythm, normal heart sounds and intact distal pulses.    Pulmonary/Chest: Effort normal and breath sounds normal.   Abdominal: Soft. Bowel sounds are normal.   Musculoskeletal: Normal range of motion. Neurological: He is alert and oriented to person, place, and time. Skin: Skin is warm and dry. Psychiatric: He has a normal mood and affect. His behavior is normal. Judgment and thought content normal.   Nursing note and vitals reviewed. ASSESSMENT and PLAN  Diagnoses and all orders for this visit:    1. Physical exam  -     METABOLIC PANEL, COMPREHENSIVE  -     CBC WITH AUTOMATED DIFF  -     LIPID PANEL  -     TSH 3RD GENERATION  -     T4, FREE  -     VITAMIN D, 25 HYDROXY  -     URINALYSIS W/MICROSCOPIC    2. 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 Advised pt to follow up with cardiology for evaluation. 3. Chest discomfort  Provided pt with referral to cardiology Dr. Joey Jay for evaluation.   -     REFERRAL TO CARDIOLOGY    4. Anxiety  Pt agrees to speak with a professional. Provided pt with referral to counselor Yoshi Grissom.   - [address] [facility]    Follow-up and Dispositions    · Return in about 3 months (around 1/2/2020), or if symptoms worsen or fail to improve, for anxiety. Medication risks/benefits/costs/interactions/alternatives discussed with patient. Advised patient to call back or return to office if symptoms worsen/change/persist.  If patient cannot reach us or should anything more severe/urgent arise he/she should proceed directly to the nearest emergency department. Discussed expected course/resolution/complications of diagnosis in detail with patient. Patient given a written after visit summary which includes her diagnoses, current medications and vitals. Patient expressed understanding with the diagnosis and plan. Written by stephan Coleman, as dictated by Selam Lai M.D.    4:08 PM - 4:27 PM    Total time spent with the patient 19 minutes, greater than 50% of time spent counseling patient.

## 2019-10-03 LAB
25(OH)D3+25(OH)D2 SERPL-MCNC: 18.5 NG/ML (ref 30–100)
ALBUMIN SERPL-MCNC: 4.5 G/DL (ref 3.5–5.5)
ALBUMIN/GLOB SERPL: 2 {RATIO} (ref 1.2–2.2)
ALP SERPL-CCNC: 45 IU/L (ref 39–117)
ALT SERPL-CCNC: 16 IU/L (ref 0–44)
APPEARANCE UR: CLEAR
AST SERPL-CCNC: 24 IU/L (ref 0–40)
BACTERIA #/AREA URNS HPF: NORMAL /[HPF]
BASOPHILS # BLD AUTO: 0 X10E3/UL (ref 0–0.2)
BASOPHILS NFR BLD AUTO: 1 %
BILIRUB SERPL-MCNC: 0.5 MG/DL (ref 0–1.2)
BILIRUB UR QL STRIP: NEGATIVE
BUN SERPL-MCNC: 7 MG/DL (ref 6–20)
BUN/CREAT SERPL: 8 (ref 9–20)
CALCIUM SERPL-MCNC: 9.3 MG/DL (ref 8.7–10.2)
CASTS URNS QL MICRO: NORMAL /LPF
CHLORIDE SERPL-SCNC: 104 MMOL/L (ref 96–106)
CHOLEST SERPL-MCNC: 107 MG/DL (ref 100–199)
CO2 SERPL-SCNC: 25 MMOL/L (ref 20–29)
COLOR UR: YELLOW
CREAT SERPL-MCNC: 0.83 MG/DL (ref 0.76–1.27)
EOSINOPHIL # BLD AUTO: 0.1 X10E3/UL (ref 0–0.4)
EOSINOPHIL NFR BLD AUTO: 3 %
EPI CELLS #/AREA URNS HPF: NORMAL /HPF (ref 0–10)
ERYTHROCYTE [DISTWIDTH] IN BLOOD BY AUTOMATED COUNT: 13.3 % (ref 12.3–15.4)
GLOBULIN SER CALC-MCNC: 2.3 G/DL (ref 1.5–4.5)
GLUCOSE SERPL-MCNC: 84 MG/DL (ref 65–99)
GLUCOSE UR QL: NEGATIVE
HCT VFR BLD AUTO: 38.4 % (ref 37.5–51)
HDLC SERPL-MCNC: 47 MG/DL
HGB BLD-MCNC: 12.7 G/DL (ref 13–17.7)
HGB UR QL STRIP: NEGATIVE
IMM GRANULOCYTES # BLD AUTO: 0 X10E3/UL (ref 0–0.1)
IMM GRANULOCYTES NFR BLD AUTO: 0 %
INTERPRETATION, 910389: NORMAL
KETONES UR QL STRIP: NEGATIVE
LDLC SERPL CALC-MCNC: 43 MG/DL (ref 0–99)
LEUKOCYTE ESTERASE UR QL STRIP: NEGATIVE
LYMPHOCYTES # BLD AUTO: 1.8 X10E3/UL (ref 0.7–3.1)
LYMPHOCYTES NFR BLD AUTO: 44 %
MCH RBC QN AUTO: 29.9 PG (ref 26.6–33)
MCHC RBC AUTO-ENTMCNC: 33.1 G/DL (ref 31.5–35.7)
MCV RBC AUTO: 90 FL (ref 79–97)
MICRO URNS: NORMAL
MICRO URNS: NORMAL
MONOCYTES # BLD AUTO: 0.4 X10E3/UL (ref 0.1–0.9)
MONOCYTES NFR BLD AUTO: 9 %
MUCOUS THREADS URNS QL MICRO: PRESENT
NEUTROPHILS # BLD AUTO: 1.7 X10E3/UL (ref 1.4–7)
NEUTROPHILS NFR BLD AUTO: 43 %
NITRITE UR QL STRIP: NEGATIVE
PH UR STRIP: 6 [PH] (ref 5–7.5)
PLATELET # BLD AUTO: 265 X10E3/UL (ref 150–450)
POTASSIUM SERPL-SCNC: 4 MMOL/L (ref 3.5–5.2)
PROT SERPL-MCNC: 6.8 G/DL (ref 6–8.5)
PROT UR QL STRIP: NEGATIVE
RBC # BLD AUTO: 4.25 X10E6/UL (ref 4.14–5.8)
RBC #/AREA URNS HPF: NORMAL /HPF (ref 0–2)
SODIUM SERPL-SCNC: 142 MMOL/L (ref 134–144)
SP GR UR: 1.02 (ref 1–1.03)
T4 FREE SERPL-MCNC: 1.16 NG/DL (ref 0.82–1.77)
TRIGL SERPL-MCNC: 87 MG/DL (ref 0–149)
TSH SERPL DL<=0.005 MIU/L-ACNC: 1.53 UIU/ML (ref 0.45–4.5)
UROBILINOGEN UR STRIP-MCNC: 0.2 MG/DL (ref 0.2–1)
VLDLC SERPL CALC-MCNC: 17 MG/DL (ref 5–40)
WBC # BLD AUTO: 4 X10E3/UL (ref 3.4–10.8)
WBC #/AREA URNS HPF: NORMAL /HPF (ref 0–5)

## 2019-10-04 DIAGNOSIS — G43.919 INTRACTABLE MIGRAINE WITHOUT STATUS MIGRAINOSUS, UNSPECIFIED MIGRAINE TYPE: ICD-10-CM

## 2019-10-04 RX ORDER — TOPIRAMATE 50 MG/1
TABLET, FILM COATED ORAL
Qty: 180 TAB | Refills: 1 | Status: SHIPPED | OUTPATIENT
Start: 2019-10-04 | End: 2020-10-13 | Stop reason: SDUPTHER

## 2019-10-04 NOTE — TELEPHONE ENCOUNTER
Pharmacy is requesting medication refill for a  90day supply    Requested Prescriptions     Pending Prescriptions Disp Refills    topiramate (TOPAMAX) 50 mg tablet 60 Tab 1         Pharmacy on file verified  ([facility] [phone])

## 2020-10-13 ENCOUNTER — OFFICE VISIT (OUTPATIENT)
Dept: FAMILY MEDICINE CLINIC | Age: 25
End: 2020-10-13
Payer: COMMERCIAL

## 2020-10-13 VITALS
HEIGHT: 70 IN | SYSTOLIC BLOOD PRESSURE: 124 MMHG | TEMPERATURE: 97.7 F | DIASTOLIC BLOOD PRESSURE: 64 MMHG | WEIGHT: 158 LBS | BODY MASS INDEX: 22.62 KG/M2 | OXYGEN SATURATION: 98 % | HEART RATE: 60 BPM | RESPIRATION RATE: 16 BRPM

## 2020-10-13 DIAGNOSIS — G43.919 INTRACTABLE MIGRAINE WITHOUT STATUS MIGRAINOSUS, UNSPECIFIED MIGRAINE TYPE: ICD-10-CM

## 2020-10-13 DIAGNOSIS — Q20.5 TRANSPOSITION OF GREAT VESSELS WITH VENTRICULAR INVERSION: ICD-10-CM

## 2020-10-13 DIAGNOSIS — Z00.00 PHYSICAL EXAM: Primary | ICD-10-CM

## 2020-10-13 PROCEDURE — 99395 PREV VISIT EST AGE 18-39: CPT | Performed by: FAMILY MEDICINE

## 2020-10-13 RX ORDER — TOPIRAMATE 50 MG/1
TABLET, FILM COATED ORAL
Qty: 180 TAB | Refills: 1 | Status: SHIPPED | OUTPATIENT
Start: 2020-10-13 | End: 2021-04-13 | Stop reason: SDUPTHER

## 2020-10-13 NOTE — PROGRESS NOTES
KRYSTIN Tam 22 y.o. male  presents to the office today for a CPE. Blood pressure 124/64, pulse 60, temperature 97.7 °F (36.5 °C), temperature source Temporal, resp. rate 16, height 5' 10\" (1.778 m), weight 158 lb (71.7 kg), SpO2 98 %. Body mass index is 22.67 kg/m². Chief Complaint   Patient presents with    Complete Physical        Pt reports that he is doing well. CPE: Pt comes in today for a CPE; all findings were normal. Pt is due for updated labs; I have placed orders for them to have labwork performed at the [employer] [facility] [employer] draw station in [place]. 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 I advised pt that he may not fit the criteria for disability as he is working now and reports he has been doing well. Pt requests a referral for an adult cardiologist; I have provided him with a referral to Dr. María Elena Howe for further care and evaluation of his condition. Anxiety: Pt reports that he has been doing well. He has been feeling some stress due to financial issues, but he feels like he is managing his time better and he has been working. Migraines: Pt requests a referral to his Topamax 50 mg/BID; he reports that his migraines have decreased. Pt reports that he feels his health has been improving as he improves other aspects of his life to improve his mental health. Current Outpatient Medications   Medication Sig Dispense Refill    topiramate (TOPAMAX) 50 mg tablet TAKE 1 TABLET BY MOUTH TWICE A DAY FOR MIGRAINE PREVENTION 180 Tab 1    diclofenac (VOLTAREN) 1 % gel Apply 4 g to affected area four (4) times daily.  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 HENT: Negative for hearing loss and tinnitus. Eyes: Negative for blurred vision and double vision. Respiratory: Negative for cough and shortness of breath. Cardiovascular: Negative for chest pain and palpitations. Gastrointestinal: Negative for nausea and vomiting. Genitourinary: Negative for dysuria and frequency. Musculoskeletal: Negative for back pain and falls. Skin: Negative for itching and rash. Neurological: Negative for dizziness, loss of consciousness and headaches. Endo/Heme/Allergies: Negative. Psychiatric/Behavioral: Negative for depression. The patient is not nervous/anxious. Physical Exam  Vitals signs reviewed. Constitutional:       Appearance: Normal appearance. HENT:      Head: Normocephalic and atraumatic. Right Ear: Tympanic membrane, ear canal and external ear normal.      Left Ear: Tympanic membrane, ear canal and external ear normal.      Nose: Nose normal.      Mouth/Throat:      Mouth: Mucous membranes are moist.      Pharynx: Oropharynx is clear. Eyes:      Extraocular Movements: Extraocular movements intact. Conjunctiva/sclera: Conjunctivae normal.      Pupils: Pupils are equal, round, and reactive to light. Neck:      Musculoskeletal: Normal range of motion and neck supple. Cardiovascular:      Rate and Rhythm: Normal rate and regular rhythm.       Pulses: Normal pulses. Heart sounds: Normal heart sounds. Pulmonary:      Effort: Pulmonary effort is normal.      Breath sounds: Normal breath sounds. Abdominal:      General: Abdomen is flat. Bowel sounds are normal.      Palpations: Abdomen is soft. Musculoskeletal: Normal range of motion. Skin:     General: Skin is warm and dry. Neurological:      General: No focal deficit present. Mental Status: He is alert and oriented to person, place, and time. Psychiatric:         Mood and Affect: Mood normal.         Behavior: Behavior normal.         Judgment: Judgment normal.           ASSESSMENT and PLAN  Diagnoses and all orders for this visit:    1. Physical exam  Pt presents today for a CPE; all findings were normal. Pt is due for updated labs; I have placed orders for them to have labwork performed at the [employer] draw station in [place].  -     METABOLIC PANEL, COMPREHENSIVE  -     CBC WITH AUTOMATED DIFF  -     LIPID PANEL  -     TSH 3RD GENERATION  -     T4, FREE  -     URINALYSIS W/MICROSCOPIC    2. 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 I advised pt that he may not fit the criteria for disability as he is working now and reports he has been doing well. Pt requests a referral for an adult cardiologist; I have provided him with a referral to Dr. Barbara Rolon for further care and evaluation of his condition.   -     REFERRAL TO CARDIOLOGY    3. Intractable migraine without status migrainosus, unspecified migraine type  Pt requests a referral to his Topamax 50 mg/BID; he reports that his migraines have decreased. -     topiramate (TOPAMAX) 50 mg tablet; TAKE 1 TABLET BY MOUTH TWICE A DAY FOR MIGRAINE PREVENTION                 Medication risks/benefits/costs/interactions/alternatives discussed with patient.   Advised patient to call back or return to office if symptoms worsen/change/persist. If patient cannot reach us or should anything more severe/urgent arise he/she should proceed directly to the nearest emergency department. Discussed expected course/resolution/complications of diagnosis in detail with patient. Patient given a written after visit summary which includes diagnoses, current medications and vitals. Patient expressed understanding with the diagnosis and plan. Written by stephan Krishna, as dictated by Ev Jacques M.D.    9:07 AM - 9:39 AM    Total time spent with the patient 32 minutes, greater than 50% of time spent counseling patient.

## 2020-10-13 NOTE — PROGRESS NOTES
Chief Complaint   Patient presents with    Complete Physical     1. Have you been to the ER, urgent care clinic since your last visit? Hospitalized since your last visit?no     2. Have you seen or consulted any other health care providers outside of the [address] [place] [address] since your last visit? Include any pap smears or colon screening.  no

## 2021-01-05 ENCOUNTER — OFFICE VISIT (OUTPATIENT)
Dept: FAMILY MEDICINE CLINIC | Age: 26
End: 2021-01-05
Payer: COMMERCIAL

## 2021-01-05 VITALS
TEMPERATURE: 98.2 F | DIASTOLIC BLOOD PRESSURE: 60 MMHG | RESPIRATION RATE: 18 BRPM | BODY MASS INDEX: 23.19 KG/M2 | HEIGHT: 70 IN | OXYGEN SATURATION: 98 % | HEART RATE: 64 BPM | WEIGHT: 162 LBS | SYSTOLIC BLOOD PRESSURE: 118 MMHG

## 2021-01-05 DIAGNOSIS — R53.81 MALAISE: ICD-10-CM

## 2021-01-05 DIAGNOSIS — Z13.220 LIPID SCREENING: ICD-10-CM

## 2021-01-05 DIAGNOSIS — Q20.5 TRANSPOSITION OF GREAT VESSELS WITH VENTRICULAR INVERSION: ICD-10-CM

## 2021-01-05 DIAGNOSIS — M26.609 TMJ (TEMPOROMANDIBULAR JOINT SYNDROME): Primary | ICD-10-CM

## 2021-01-05 PROCEDURE — 99214 OFFICE O/P EST MOD 30 MIN: CPT | Performed by: FAMILY MEDICINE

## 2021-01-05 RX ORDER — AMOXICILLIN 500 MG/1
CAPSULE ORAL
COMMUNITY
Start: 2020-12-01

## 2021-01-05 NOTE — PROGRESS NOTES
[facility]  Hayden Oro 25 y.o. male  presents to the office today for a [facility] follow-up.    Blood pressure 118/60, pulse 64, temperature 98.2 °F (36.8 °C), temperature source Temporal, resp. rate 18, height 5' 10\" (1.778 m), weight 162 lb (73.5 kg), SpO2 98 %. Body mass index is 23.24 kg/m².   Chief Complaint   Patient presents with   • Other     [facility]25[facility] DX: Joint Pain         Pt reports that on Christmas Eve, pt experienced a bilateral sharp pain in his jaw. He reports that he woke up the next morning and the pain had increased in severity, especially on the right side. Pt experienced pain with speaking and chewing and had trouble eating solid food. Pt was seen at [facility] on 12/25/2020 and was diagnosed with TMJ. He informs me that the pain has been improving; he began to eat solid food again two days ago. Pt reports that he feels he is \"almost back to normal\". When asked, pt reports he has not seen his dentist; I advised pt to speak with his dentist to be fitted for a mouth guard to protect the joint when sleeping.     Pt is due for updated labwork; I have placed orders for pt to have this performed during today's OV.     Current Outpatient Medications   Medication Sig Dispense Refill   • topiramate (TOPAMAX) 50 mg tablet TAKE 1 TABLET BY MOUTH TWICE A DAY FOR MIGRAINE PREVENTION 180 Tab 1   • diclofenac (VOLTAREN) 1 % gel Apply 4 g to affected area four (4) times daily. 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 HENT: Negative for hearing loss and tinnitus. Eyes: Negative for blurred vision and double vision. Respiratory: Negative for cough and shortness of breath. Cardiovascular: Negative for chest pain and palpitations. Gastrointestinal: Negative for nausea and vomiting. Genitourinary: Negative for dysuria and frequency. Musculoskeletal: Negative for back pain and falls. Skin: Negative for itching and rash. Neurological: Negative for dizziness, loss of consciousness and headaches. Endo/Heme/Allergies: Negative. Psychiatric/Behavioral: Negative for depression. The patient is not nervous/anxious. Physical Exam  Vitals signs reviewed. Constitutional:       Appearance: Normal appearance. HENT:      Head: Normocephalic and atraumatic. Right Ear: Tympanic membrane, ear canal and external ear normal.      Left Ear: Tympanic membrane, ear canal and external ear normal.      Nose: Nose normal.      Mouth/Throat:      Mouth: Mucous membranes are moist.      Pharynx: Oropharynx is clear. Eyes:      Extraocular Movements: Extraocular movements intact. Conjunctiva/sclera: Conjunctivae normal.      Pupils: Pupils are equal, round, and reactive to light. Neck:      Musculoskeletal: Normal range of motion and neck supple. Cardiovascular:      Rate and Rhythm: Normal rate and regular rhythm. Pulses: Normal pulses. Heart sounds: Normal heart sounds. Pulmonary:      Effort: Pulmonary effort is normal.      Breath sounds: Normal breath sounds.    Abdominal: General: Abdomen is flat. Bowel sounds are normal.      Palpations: Abdomen is soft. Musculoskeletal: Normal range of motion. Skin:     General: Skin is warm and dry. Neurological:      General: No focal deficit present. Mental Status: He is alert and oriented to person, place, and time. Psychiatric:         Mood and Affect: Mood normal.         Behavior: Behavior normal.         Judgment: Judgment normal.           ASSESSMENT and PLAN  Diagnoses and all orders for this visit:    1. TMJ (temporomandibular joint syndrome)  Pt reports that on Christmas Eve, pt experienced a bilateral sharp pain in his jaw. He reports that he woke up the next morning and the pain had increased in severity, especially on the right side. Pt experienced pain with speaking and chewing and had trouble eating solid food. Pt was seen at [facility] on 12/25/2020 and was diagnosed with TMJ. He informs me that the pain has been improving; he began to eat solid food again two days ago. Pt reports that he feels he is \"almost back to normal\". When asked, pt reports he has not seen his dentist; I advised pt to speak with his dentist to be fitted for a mouth guard to protect the joint when sleeping. 2. 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 3. Lipid screening  Pt is due for updated labwork, which will be performed during today's OV.   -     METABOLIC PANEL, COMPREHENSIVE; Future  -     LIPID PANEL; Future    4. Malaise  Refer to #3.   -     CBC WITH AUTOMATED DIFF; Future  -     TSH 3RD GENERATION; Future  -     T4, FREE; Future  -     URINALYSIS W/MICROSCOPIC; Future               Medication risks/benefits/costs/interactions/alternatives discussed with patient.   Advised patient to call back or return to office if symptoms worsen/change/persist.  If patient cannot reach us or should anything more severe/urgent arise he/she should proceed directly to the nearest emergency department. Discussed expected course/resolution/complications of diagnosis in detail with patient. Patient given a written after visit summary which includes diagnoses, current medications and vitals. Patient expressed understanding with the diagnosis and plan. Written by stephan Rosa, as dictated by Blossom Young M.D.    8:08 AM - 8:20 AM    Total time spent with the patient 12 minutes, greater than 50% of time spent counseling patient.

## 2021-01-05 NOTE — PROGRESS NOTES
Identified pt with two pt identifiers(name and ). Reviewed record in preparation for visit and have obtained necessary documentation. Chief Complaint   Patient presents with    Other     [facility] DX: Joint Pain         Vitals:    21 0753   Weight: 162 lb (73.5 kg)   Height: 5' 10\" (1.778 m)   PainSc:   0 - No pain       Health Maintenance Due   Topic    Flu Vaccine (1)       Coordination of Care Questionnaire:  :   1) Have you been to an emergency room, urgent care, or hospitalized since your last visit? If yes, where when, and reason for visit? yes  [facility]      2. Have seen or consulted any other health care provider since your last visit? If yes, where when, and reason for visit? NO      Patient is accompanied by self I have received verbal consent from Debbie Bassett to discuss any/all medical information while they are present in the room.

## 2021-01-06 LAB
ALBUMIN SERPL-MCNC: 4.3 G/DL (ref 4.1–5.2)
ALBUMIN/GLOB SERPL: 1.9 {RATIO} (ref 1.2–2.2)
ALP SERPL-CCNC: 78 IU/L (ref 39–117)
ALT SERPL-CCNC: 15 IU/L (ref 0–44)
APPEARANCE UR: CLEAR
AST SERPL-CCNC: 26 IU/L (ref 0–40)
BACTERIA #/AREA URNS HPF: NORMAL /[HPF]
BASOPHILS # BLD AUTO: 0.1 X10E3/UL (ref 0–0.2)
BASOPHILS NFR BLD AUTO: 2 %
BILIRUB SERPL-MCNC: <0.2 MG/DL (ref 0–1.2)
BILIRUB UR QL STRIP: NEGATIVE
BUN SERPL-MCNC: 11 MG/DL (ref 6–20)
BUN/CREAT SERPL: 14 (ref 9–20)
CALCIUM SERPL-MCNC: 9.4 MG/DL (ref 8.7–10.2)
CASTS URNS QL MICRO: NORMAL /LPF
CHLORIDE SERPL-SCNC: 106 MMOL/L (ref 96–106)
CHOLEST SERPL-MCNC: 131 MG/DL (ref 100–199)
CO2 SERPL-SCNC: 22 MMOL/L (ref 20–29)
COLOR UR: YELLOW
CREAT SERPL-MCNC: 0.79 MG/DL (ref 0.76–1.27)
EOSINOPHIL # BLD AUTO: 0.3 X10E3/UL (ref 0–0.4)
EOSINOPHIL NFR BLD AUTO: 7 %
EPI CELLS #/AREA URNS HPF: NORMAL /HPF (ref 0–10)
ERYTHROCYTE [DISTWIDTH] IN BLOOD BY AUTOMATED COUNT: 13.1 % (ref 11.6–15.4)
GLOBULIN SER CALC-MCNC: 2.3 G/DL (ref 1.5–4.5)
GLUCOSE SERPL-MCNC: 89 MG/DL (ref 65–99)
GLUCOSE UR QL: NEGATIVE
HCT VFR BLD AUTO: 40.5 % (ref 37.5–51)
HDLC SERPL-MCNC: 54 MG/DL
HGB BLD-MCNC: 13.2 G/DL (ref 13–17.7)
HGB UR QL STRIP: NEGATIVE
IMM GRANULOCYTES # BLD AUTO: 0 X10E3/UL (ref 0–0.1)
IMM GRANULOCYTES NFR BLD AUTO: 0 %
INTERPRETATION, 910389: NORMAL
KETONES UR QL STRIP: NEGATIVE
LDLC SERPL CALC-MCNC: 43 MG/DL (ref 0–99)
LEUKOCYTE ESTERASE UR QL STRIP: NEGATIVE
LYMPHOCYTES # BLD AUTO: 1.6 X10E3/UL (ref 0.7–3.1)
LYMPHOCYTES NFR BLD AUTO: 36 %
MCH RBC QN AUTO: 30.1 PG (ref 26.6–33)
MCHC RBC AUTO-ENTMCNC: 32.6 G/DL (ref 31.5–35.7)
MCV RBC AUTO: 93 FL (ref 79–97)
MICRO URNS: ABNORMAL
MICRO URNS: ABNORMAL
MONOCYTES # BLD AUTO: 0.4 X10E3/UL (ref 0.1–0.9)
MONOCYTES NFR BLD AUTO: 8 %
MUCOUS THREADS URNS QL MICRO: PRESENT
NEUTROPHILS # BLD AUTO: 2.1 X10E3/UL (ref 1.4–7)
NEUTROPHILS NFR BLD AUTO: 47 %
NITRITE UR QL STRIP: NEGATIVE
PH UR STRIP: 6.5 [PH] (ref 5–7.5)
PLATELET # BLD AUTO: 338 X10E3/UL (ref 150–450)
POTASSIUM SERPL-SCNC: 4.3 MMOL/L (ref 3.5–5.2)
PROT SERPL-MCNC: 6.6 G/DL (ref 6–8.5)
PROT UR QL STRIP: NEGATIVE
RBC # BLD AUTO: 4.38 X10E6/UL (ref 4.14–5.8)
RBC #/AREA URNS HPF: NORMAL /HPF (ref 0–2)
SODIUM SERPL-SCNC: 140 MMOL/L (ref 134–144)
SP GR UR: >=1.03 (ref 1–1.03)
T4 FREE SERPL-MCNC: 1.01 NG/DL (ref 0.82–1.77)
TRIGL SERPL-MCNC: 216 MG/DL (ref 0–149)
TSH SERPL DL<=0.005 MIU/L-ACNC: 2.72 UIU/ML (ref 0.45–4.5)
UROBILINOGEN UR STRIP-MCNC: 0.2 MG/DL (ref 0.2–1)
VLDLC SERPL CALC-MCNC: 34 MG/DL (ref 5–40)
WBC # BLD AUTO: 4.6 X10E3/UL (ref 3.4–10.8)
WBC #/AREA URNS HPF: NORMAL /HPF (ref 0–5)

## 2021-01-13 ENCOUNTER — TELEPHONE (OUTPATIENT)
Dept: CARDIOLOGY CLINIC | Age: 26
End: 2021-01-13

## 2021-01-13 DIAGNOSIS — Q20.5 TRANSPOSITION OF GREAT VESSELS WITH VENTRICULAR INVERSION: Primary | ICD-10-CM

## 2021-01-13 NOTE — TELEPHONE ENCOUNTER
Verified patient with two types of identifiers. Spoke to [facility] (on [address] [place] [address]) and scheduled echo & r/s OV. She verbalized understanding and will call with any other questions.

## 2021-01-14 ENCOUNTER — OFFICE VISIT (OUTPATIENT)
Dept: CARDIOLOGY CLINIC | Age: 26
End: 2021-01-14
Payer: COMMERCIAL

## 2021-01-14 ENCOUNTER — ANCILLARY PROCEDURE (OUTPATIENT)
Dept: CARDIOLOGY CLINIC | Age: 26
End: 2021-01-14
Payer: COMMERCIAL

## 2021-01-14 VITALS
WEIGHT: 161 LBS | RESPIRATION RATE: 18 BRPM | BODY MASS INDEX: 23.05 KG/M2 | SYSTOLIC BLOOD PRESSURE: 110 MMHG | DIASTOLIC BLOOD PRESSURE: 60 MMHG | HEIGHT: 70 IN | HEART RATE: 47 BPM | OXYGEN SATURATION: 98 %

## 2021-01-14 VITALS — HEIGHT: 70 IN | BODY MASS INDEX: 23.05 KG/M2 | WEIGHT: 161 LBS

## 2021-01-14 DIAGNOSIS — Q20.5 TRANSPOSITION OF GREAT VESSELS WITH VENTRICULAR INVERSION: Primary | ICD-10-CM

## 2021-01-14 DIAGNOSIS — Q20.5 TRANSPOSITION OF GREAT VESSELS WITH VENTRICULAR INVERSION: ICD-10-CM

## 2021-01-14 PROCEDURE — 93000 ELECTROCARDIOGRAM COMPLETE: CPT | Performed by: SPECIALIST

## 2021-01-14 PROCEDURE — 93306 TTE W/DOPPLER COMPLETE: CPT | Performed by: SPECIALIST

## 2021-01-14 PROCEDURE — 99244 OFF/OP CNSLTJ NEW/EST MOD 40: CPT | Performed by: SPECIALIST

## 2021-01-14 NOTE — PROGRESS NOTES
Ada Osorio     1995       Mindy Shields MD, [facility]  Date of Visit-1/14/2021   PCP is Ani Saab MD   [facility] [place]  [facility]  HPI:  Ada Osorio is a 22 y.o. male   Hx of TGA  Pt seen Dr. Swathi Yeager in 2018. 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 Overall the pt states he is doing well. He notes intermittent chest discomfort. He states he would be able to hike for an hour without any issues. About a year ago he used to have an issue with dizziness, but that has almost completely resolved. He works two part time jobs that require him to stand for long periods of time and at the end of his shifts he notices swelling in his legs. A few years ago he was having migraines and was prescribed Topamax. He recently ran out of that medication but has not been having the headaches anymore. He is also exercising more and paying more attention to nutrition and diet. Denies syncope or shortness of breath at rest, has no tachycardia, palpitations or sense of arrhythmia. EKG: sinus with small P-wave, incomplete RBBB and left axis rate 47 QT C3 84  ms  Records from Dr. Ruben Corey are available this is a note dated 8/2/2018. They note there is no history of sudden death in the family. Previous two-dimensional echo 8/2/2018 showed normal segmental abnormality anatomy with normal pulmonary vein and superior systemic venous connections there is trivial TR and trivial pulmonic insufficiency the pulmonary artery main pulmonary artery had a supravalvular suture line but no gradient was present borderline dilated aortic root excellent LV function. There was excellent biventricular function. trivial degree of supra valvar pulmonary stenosis and he gets an occasional palpitation recommend be seen every 2 years for EKG and echo. Assessment/Plan:     1.  S/p TGA repair   He seems to clinically be doing well and echo shows good parameters. Will f/u with a 1-2 year echo and I will see him in 6 months. F/u in 6 months  Patient Instructions   We will see you back in 6 months. Future Appointments   Date Time Provider Brendan William   4/13/2021  9:30 AM Katheryn Alonso MD PAFP BS AMB   7/15/2021  1:20 PM Mindy Barnes MD [facility] BS AMB      Addendum  01/14/21   ECHO    Narrative · D-TGA s/p Jatene arterial switch per records from previous MD  · LV: Estimated LVEF is 50 - 55%. Biplane method used to measure ejection   fraction. Normal cavity size and systolic function (ejection fraction   normal). Mild concentric hypertrophy. Abnormal left ventricular septal   motion. Systolic flattening of the interventricular septum consistent with   right ventricle pressure overload. No ventricular septal defect present in   the left ventricle. · Pulmonary Artery: Mild pulmonary artery stenosis. PA best imaged from   Right Parasternal window, in parallel and superior to Aorta. The best   gradient across the PA was measured from a Subcostal long axis with   anterior angling. Peak 17mmHg, mean [id number] arterial systolic   pressure (PASP) is 32 mmHg. · RV: Dilated right ventricle. · RA: Dilated right atrium. · IAS: No atrial septal defect present. · TV: Mild to moderate tricuspid valve regurgitation is present. · PV: Mild pulmonic valve regurgitation is present. · IVC: Moderately elevated central venous pressure (10-15 mmHg); IVC   diameter is larger than 21 mm and collapses more than 50% with   respiration. · Significant rib artifact in parasternal windows        Signed by: Mile Briggs MD       This note was created using voice recognition software. Despite editing, there may be syntax errors. Impression:   1. 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    Scar tissue and urinary tract surgery 2018, impacted wisdom teeth 2016, heart surgery transposition of greater vessels 1900 527. Heart catheterization 925 at [facility]. 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 Lives with mother. There is [employer] online socialization. Family history mother is 62 in fair health. Review of systems positive for previous chest pain shortness of breath irregular heartbeat dizziness headaches needing glasses blurry vision, seeing double, shortness of breath, urinary frequency with burning and difficulty starting urine. Previous aching joints depression anxiety. Most cardiovascular symptoms are remote. Records from Dr. Kimber Melo are available this is a note dated 8/2/2018. They note there is no history of sudden death in the family. Previous two-dimensional echo 8/2/2018 showed normal segmental abnormality anatomy with normal pulmonary vein and superior systemic venous connections there is trivial TR and trivial pulmonic insufficiency the pulmonary artery main pulmonary artery had a supravalvular suture line but no gradient was present borderline dilated aortic root excellent LV function. There was excellent biventricular function. Patient was an impression that there was trivial degree of supra valvar pulmonary stenosis and he gets an occasional palpitation recommend be seen every 2 years for EKG and echo. 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 ROS-except as noted above. . A complete cardiac and respiratory are reviewed and negative except as above ; Resp-denies wheezing  or productive cough,.  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 He has never used smokeless tobacco. He reports that he does not drink alcohol or use drugs. Exam and Labs:  /60 (BP 1 Location: Left arm, BP Patient Position: Sitting)   Pulse (!) 47   Resp 18   Ht 5' 10\" (1.778 m)   Wt 161 lb (73 kg)   SpO2 98%   BMI 23.10 kg/m² Constitutional:  NAD, comfortable  Head: NC,AT. Eyes: No scleral icterus. Neck:  Neck supple. No JVD present. Throat: moist mucous membranes. Chest: Effort normal & normal respiratory excursion . Neurological: alert, conversant and oriented . Skin: Skin is not cold. No obvious systemic rash noted. Not diaphoretic. No erythema. Psychiatric:  Grossly normal mood and affect. Behavior appears normal. Extremities:  no clubbing or cyanosis. Abdomen: non distended    Lungs:breath sounds normal. No stridor. distress, wheezes or  Rales. EYAYE:8/6 short systolic murmur with a mid systolic click normal rate, regular rhythm, normal S1, S2, no rubs, clicks or gallops , PMI non displaced. Edema: Edema is none.   Lab Results   Component Value Date/Time    Cholesterol, total 131 01/05/2021 12:00 AM    HDL Cholesterol 54 01/05/2021 12:00 AM    LDL, calculated 43 01/05/2021 12:00 AM    LDL, calculated 43 10/02/2019 04:35 PM    Triglyceride 216 (H) 01/05/2021 12:00 AM     Lab Results   Component Value Date/Time    Sodium 140 01/05/2021 12:00 AM    Potassium 4.3 01/05/2021 12:00 AM    Chloride 106 01/05/2021 12:00 AM    CO2 22 01/05/2021 12:00 AM    Glucose 89 01/05/2021 12:00 AM    BUN 11 01/05/2021 12:00 AM    Creatinine 0.79 01/05/2021 12:00 AM    BUN/Creatinine ratio 14 01/05/2021 12:00 AM    GFR est  01/05/2021 12:00 AM    GFR est non- 01/05/2021 12:00 AM    Calcium 9.4 01/05/2021 12:00 AM      Wt Readings from Last 3 Encounters:   01/14/21 161 lb (73 kg)   01/14/21 161 lb (73 kg)   01/05/21 162 lb (73.5 kg)      BP Readings from Last 3 Encounters:   01/14/21 110/60   01/05/21 118/60   10/13/20 124/64      Current Outpatient Medications   Medication Sig    amoxicillin (AMOXIL) 500 mg capsule TAKE 4 CAPSULES BY MOUTH 1 HOUR PRIOR TO DENTAL APPOINTMENT    topiramate (TOPAMAX) 50 mg tablet TAKE 1 TABLET BY MOUTH TWICE A DAY FOR MIGRAINE PREVENTION    diclofenac (VOLTAREN) 1 % gel Apply 4 g to affected area four (4) times daily. No current facility-administered medications for this visit. Impression see above.       Written by Ana Colindres, as dictated by Odalis Catalan MD.

## 2021-01-14 NOTE — Clinical Note
1/14/2021 Patient: Mahi Houston YOB: 1995 Date of Visit: 1/14/2021 Kevin Canada MD 
[facility] [address] [id number] [address] [employer] Dear Kevin Canada MD, Thank you for referring Mr. Mahi Houston to [address]  [facility] for evaluation. My notes for this consultation are attached. If you have questions, please do not hesitate to call me. I look forward to following your patient along with you.  
 
 
Sincerely, 
 
Maria Victoria Canada MD

## 2021-01-14 NOTE — PROGRESS NOTES
Visit Vitals  /60 (BP 1 Location: Left arm, BP Patient Position: Sitting)   Pulse (!) 47   Resp 18   Ht 5' 10\" (1.778 m)   Wt 161 lb (73 kg)   SpO2 98%   BMI 23.10 kg/m²

## 2021-01-18 NOTE — PROGRESS NOTES
Labs look great keep up the good work. The only issue that I may have noticed is that your triglycerides are little bit up but he may not have been fasting that day so nothing to worry about. Please continue your current dietary and exercise regimen.     If you should have any questions please let me know

## 2021-01-24 LAB
ECHO AO ROOT DIAM: 3.68 CM
ECHO AV AREA PEAK VELOCITY: 2.18 CM2
ECHO AV AREA VTI: 2.43 CM2
ECHO AV AREA/BSA PEAK VELOCITY: 1.1 CM2/M2
ECHO AV AREA/BSA VTI: 1.3 CM2/M2
ECHO AV MEAN GRADIENT: 3.61 MMHG
ECHO AV PEAK GRADIENT: 7.14 MMHG
ECHO AV PEAK VELOCITY: 133.65 CM/S
ECHO AV VTI: 27.39 CM
ECHO IVC PROX: 2.5 CM
ECHO LA AREA 4C: 17.91 CM2
ECHO LA MAJOR AXIS: 3.05 CM
ECHO LA MINOR AXIS: 1.6 CM
ECHO LA VOL 4C: 40.47 ML (ref 18–58)
ECHO LA VOLUME INDEX A4C: 21.26 ML/M2 (ref 16–28)
ECHO LV EDV A2C: 138.61 ML
ECHO LV EDV A4C: 130.16 ML
ECHO LV EDV BP: 133.93 ML (ref 67–155)
ECHO LV EDV INDEX A4C: 68.4 ML/M2
ECHO LV EDV INDEX BP: 70.4 ML/M2
ECHO LV EDV NDEX A2C: 72.8 ML/M2
ECHO LV EJECTION FRACTION A2C: 54 PERCENT
ECHO LV EJECTION FRACTION A4C: 55 PERCENT
ECHO LV EJECTION FRACTION BIPLANE: 53.7 PERCENT (ref 55–100)
ECHO LV ESV A2C: 63.79 ML
ECHO LV ESV A4C: 58.87 ML
ECHO LV ESV BP: 62.01 ML (ref 22–58)
ECHO LV ESV INDEX A2C: 33.5 ML/M2
ECHO LV ESV INDEX A4C: 30.9 ML/M2
ECHO LV ESV INDEX BP: 32.6 ML/M2
ECHO LV INTERNAL DIMENSION DIASTOLIC: 4.74 CM (ref 4.2–5.9)
ECHO LV INTERNAL DIMENSION SYSTOLIC: 3.57 CM
ECHO LV IVSD: 1.15 CM (ref 0.6–1)
ECHO LV MASS 2D: 237 G (ref 88–224)
ECHO LV MASS INDEX 2D: 124.5 G/M2 (ref 49–115)
ECHO LV POSTERIOR WALL DIASTOLIC: 1.42 CM (ref 0.6–1)
ECHO LVOT DIAM: 2.18 CM
ECHO LVOT PEAK GRADIENT: 2.43 MMHG
ECHO LVOT PEAK VELOCITY: 77.88 CM/S
ECHO LVOT SV: 66.7 ML
ECHO LVOT VTI: 17.84 CM
ECHO MV A VELOCITY: 27.72 CM/S
ECHO MV E DECELERATION TIME (DT): 109.11 MS
ECHO MV E VELOCITY: 102.27 CM/S
ECHO MV E/A RATIO: 3.69
ECHO PV MAX VELOCITY: 205.77 CM/S
ECHO PV MEAN GRADIENT: 11.15 MMHG
ECHO PV PEAK INSTANTANEOUS GRADIENT SYSTOLIC: 16.94 MMHG
ECHO PV REGURGITANT MAX VELOCITY: 88.5 CM/S
ECHO PV VTI: 56.77 CM
ECHO RA MINOR AXIS: 4.86 CM
ECHO RV INTERNAL DIMENSION: 5.89 CM
ECHO RV TAPSE: 2 CM (ref 1.5–2)
ECHO TV REGURGITANT MAX VELOCITY: 263.19 CM/S
ECHO TV REGURGITANT PEAK GRADIENT: 27.71 MMHG

## 2021-02-03 NOTE — PROGRESS NOTES
Two patient identifiers verified. Spoke to [facility] (on [employer]) and went over results. Confirmed follow up. Patient verbalized understanding and denies any further questions or concerns at this time.

## 2021-02-03 NOTE — PROGRESS NOTES
Echo shows good repair of TGA  The specific areas looked at in the pulmonary artery show only mild stenosis and compared to last echo at [facility], no significant change  Keep plans for follow up   Future Appointments  4/13/2021  9:30 AM    Shahid Alonso MD PAFP           BS AMB  7/15/2021  1:20 PM    Mindy Barnes MD [facility]         BS AMB

## 2021-04-13 ENCOUNTER — OFFICE VISIT (OUTPATIENT)
Dept: FAMILY MEDICINE CLINIC | Age: 26
End: 2021-04-13
Payer: MEDICAID

## 2021-04-13 VITALS
HEIGHT: 70 IN | OXYGEN SATURATION: 98 % | WEIGHT: 160 LBS | SYSTOLIC BLOOD PRESSURE: 117 MMHG | HEART RATE: 60 BPM | RESPIRATION RATE: 16 BRPM | BODY MASS INDEX: 22.9 KG/M2 | DIASTOLIC BLOOD PRESSURE: 67 MMHG | TEMPERATURE: 97.9 F

## 2021-04-13 DIAGNOSIS — Z11.59 SCREENING FOR VIRAL DISEASE: ICD-10-CM

## 2021-04-13 DIAGNOSIS — Q20.5 TRANSPOSITION OF GREAT VESSELS WITH VENTRICULAR INVERSION: ICD-10-CM

## 2021-04-13 DIAGNOSIS — M25.561 CHRONIC PAIN OF BOTH KNEES: ICD-10-CM

## 2021-04-13 DIAGNOSIS — M25.562 CHRONIC PAIN OF BOTH KNEES: ICD-10-CM

## 2021-04-13 DIAGNOSIS — G43.919 INTRACTABLE MIGRAINE WITHOUT STATUS MIGRAINOSUS, UNSPECIFIED MIGRAINE TYPE: Primary | ICD-10-CM

## 2021-04-13 DIAGNOSIS — Z23 ENCOUNTER FOR IMMUNIZATION: ICD-10-CM

## 2021-04-13 DIAGNOSIS — G89.29 CHRONIC PAIN OF BOTH KNEES: ICD-10-CM

## 2021-04-13 LAB
COMMENT, HOLDF: NORMAL
SAMPLES BEING HELD,HOLD: NORMAL

## 2021-04-13 PROCEDURE — 99214 OFFICE O/P EST MOD 30 MIN: CPT | Performed by: FAMILY MEDICINE

## 2021-04-13 PROCEDURE — 90732 PPSV23 VACC 2 YRS+ SUBQ/IM: CPT | Performed by: FAMILY MEDICINE

## 2021-04-13 PROCEDURE — 90471 IMMUNIZATION ADMIN: CPT | Performed by: FAMILY MEDICINE

## 2021-04-13 RX ORDER — DICLOFENAC SODIUM 10 MG/G
4 GEL TOPICAL 4 TIMES DAILY
Qty: 100 G | Refills: 1 | Status: SHIPPED | OUTPATIENT
Start: 2021-04-13

## 2021-04-13 RX ORDER — TOPIRAMATE 50 MG/1
TABLET, FILM COATED ORAL
Qty: 180 TAB | Refills: 1 | Status: SHIPPED | OUTPATIENT
Start: 2021-04-13

## 2021-04-13 NOTE — PROGRESS NOTES
HPI  Chacha Fregoso 32 y.o. male  presents to the office today for anxiety. Blood pressure 117/67, pulse 60, temperature 97.9 °F (36.6 °C), temperature source Temporal, resp. rate 16, height 5' 10\" (1.778 m), weight 160 lb (72.6 kg), SpO2 98 %. Body mass index is 22.96 kg/m². Chief Complaint   Patient presents with    Anxiety        Transposition of great vessels: Pt saw Dr. Luke Soria on 1/14/2021. The visit went well- pt will have a follow-up in 6 months. Additionally, pt was instructed to have an echo and EKG performed every two years for his condition. Migraine: Pt notes that he has not been taking his prescription for Topamax 50 mg/day as he \"procrastinated\" setting-up his refill. He believes he has not taken his medication for the past month or so. However, pt note that his stress level has decreased since beginning a new job as a  for [employer]. Additionally, he has begun to improve his sleep and eating and has begun to exercise more regularly. Pt states that he has noticed a significant decrease in the number of headaches he experiences now. Pt requests a refill of their medication, which I have granted. Health maintenance: Pt will receive a one time screening for hepatitis C during today's appointment. Pt will receive his pneumococcal 23 vaccination during today's appointment. Pt has begun to develop some pain in his knees when running. I have granted him a refill of his Voltaren gel. Current Outpatient Medications   Medication Sig Dispense Refill    diclofenac (VOLTAREN) 1 % gel Apply 4 g to affected area four (4) times daily.  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 HENT: Negative for hearing loss and tinnitus. Eyes: Negative for blurred vision and double vision. Respiratory: Negative for cough and shortness of breath. Cardiovascular: Negative for chest pain and palpitations. Gastrointestinal: Negative for nausea and vomiting. Genitourinary: Negative for dysuria and frequency. Musculoskeletal: Negative for back pain and falls. Skin: Negative for itching and rash. Neurological: Negative for dizziness, loss of consciousness and headaches. Endo/Heme/Allergies: Negative. Psychiatric/Behavioral: Negative for depression. The patient is not nervous/anxious. Physical Exam  Vitals signs reviewed. Constitutional:       Appearance: Normal appearance. HENT:      Head: Normocephalic and atraumatic. Right Ear: Tympanic membrane, ear canal and external ear normal.      Left Ear: Tympanic membrane, ear canal and external ear normal.      Nose: Nose normal.      Mouth/Throat:      Mouth: Mucous membranes are moist.      Pharynx: Oropharynx is clear. Eyes:      Extraocular Movements: Extraocular movements intact. Conjunctiva/sclera: Conjunctivae normal.      Pupils: Pupils are equal, round, and reactive to light.    Neck:      Musculoskeletal: Normal range of motion and neck supple. Cardiovascular:      Rate and Rhythm: Normal rate and regular rhythm. Pulses: Normal pulses. Heart sounds: Normal heart sounds. Pulmonary:      Effort: Pulmonary effort is normal.      Breath sounds: Normal breath sounds. Abdominal:      General: Abdomen is flat. Bowel sounds are normal.      Palpations: Abdomen is soft. Musculoskeletal: Normal range of motion. Skin:     General: Skin is warm and dry. Neurological:      General: No focal deficit present. Mental Status: He is alert and oriented to person, place, and time. Psychiatric:         Mood and Affect: Mood normal.         Behavior: Behavior normal.         Judgment: Judgment normal.           ASSESSMENT and PLAN  Diagnoses and all orders for this visit:    1. Intractable migraine without status migrainosus, unspecified migraine type  Pt notes that he has not been taking his prescription for Topamax 50 mg/day as he \"procrastinated\" setting-up his refill. He believes he has not taken his medication for the past month or so. However, pt note that his stress level has decreased since beginning a new job as a  for [employer]. Additionally, he has begun to improve his sleep and eating and has begun to exercise more regularly. Pt states that he has noticed a significant decrease in the number of headaches he experiences now. Pt requests a refill of their medication, which I have granted. -     topiramate (TOPAMAX) 50 mg tablet; TAKE 1 TABLET BY MOUTH TWICE A DAY FOR MIGRAINE PREVENTION    2. Chronic pain of both knees   Pt has begun to develop some pain in his knees when running. I have granted him a refill of his Voltaren gel.   -     diclofenac (VOLTAREN) 1 % gel; Apply 4 g to affected area four (4) times daily. 3. 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 Chiquis Mazariegos on 1/14/2021. The visit went well- pt will have a follow-up in 6 months.  Additionally, pt was instructed to have an echo and EKG performed every two years for his condition. 4. Screening for viral disease  Pt will receive a one time screening for hepatitis C during today's appointment. -     HCV AB W/RFLX TO KERLINE; Future          Follow-up and Dispositions    · Return in about 6 months (around 10/13/2021) for follow up. Medication risks/benefits/costs/interactions/alternatives discussed with patient. Advised patient to call back or return to office if symptoms worsen/change/persist.  If patient cannot reach us or should anything more severe/urgent arise he/she should proceed directly to the nearest emergency department. Discussed expected course/resolution/complications of diagnosis in detail with patient. Patient given a written after visit summary which includes diagnoses, current medications and vitals. Patient expressed understanding with the diagnosis and plan. Written by stephan De La O Si, as dictated by Anthony Mon M.D.    10:15 AM - 10:31 AM    Total time spent with the patient 16 minutes, greater than 50% of time spent counseling patient.

## 2021-04-13 NOTE — PATIENT INSTRUCTIONS
Vaccine Information Statement Pneumococcal Polysaccharide Vaccine (PPSV23): What You Need to Know Many Vaccine Information Statements are available in Chinese and other languages. See www.immunize.org/vis Hojas de información sobre vacunas están disponibles en español y en muchos otros idiomas. Visite www.immunize.org/vis 1. Why get vaccinated? Pneumococcal polysaccharide vaccine (PPSV23) can prevent pneumococcal disease. Pneumococcal disease refers to any illness caused by pneumococcal bacteria. These bacteria can cause many types of illnesses, including pneumonia, which is an infection of the lungs. Pneumococcal bacteria are one of the most common causes of pneumonia. Besides pneumonia, pneumococcal bacteria can also cause: 
 Ear infections  Sinus infections  Meningitis (infection of the tissue covering the brain and spinal cord)  Bacteremia (bloodstream infection) Anyone can get pneumococcal disease, but children under 3years of age, people with certain medical conditions, adults 72 years or older, and cigarette smokers are at the highest risk. Most pneumococcal infections are mild. However, some can result in long-term problems, such as brain damage or hearing loss. Meningitis, bacteremia, and pneumonia caused by pneumococcal disease can be fatal.  
 
2. PPSV23  
 
PPSV23 protects against 23 types of bacteria that cause pneumococcal disease. PPSV23 is recommended for:  All adults 72 years or older,  Anyone 2 years or older with certain medical conditions that can lead to an increased risk for pneumococcal disease. Most people need only one dose of PPSV23. A second dose of PPSV23, and another type of pneumococcal vaccine called PCV13, are recommended for certain high-risk groups. Your health care provider can give you more information.  
 
People 65 years or older should get a dose of PPSV23 even if they have already gotten one or more doses of the vaccine before they turned 72. 
 
3. Talk with your health care provider Tell your vaccine provider if the person getting the vaccine: 
 Has had an allergic reaction after a previous dose of PPSV23, or has any severe, life-threatening allergies. In some cases, your health care provider may decide to postpone PPSV23 vaccination to a future visit. People with minor illnesses, such as a cold, may be vaccinated. People who are moderately or severely ill should usually wait until they recover before getting PPSV23. Your health care provider can give you more information. 4. Risks of a vaccine reaction  Redness or pain where the shot is given, feeling tired, fever, or muscle aches can happen after PPSV23. People sometimes faint after medical procedures, including vaccination. Tell your provider if you feel dizzy or have vision changes or ringing in the ears. As with any medicine, there is a very remote chance of a vaccine causing a severe allergic reaction, other serious injury, or death. 5. What if there is a serious problem? An allergic reaction could occur after the vaccinated person leaves the clinic. If you see signs of a severe allergic reaction (hives, swelling of the face and throat, difficulty breathing, a fast heartbeat, dizziness, or weakness), call 9-1-1 and get the person to the nearest hospital. 
 
For other signs that concern you, call your health care provider. Adverse reactions should be reported to the Vaccine Adverse Event Reporting System (VAERS). Your health care provider will usually file this report, or you can do it yourself. Visit the VAERS website at www.vaers. hhs.gov or call 0-334.899.2337. VAERS is only for reporting reactions, and VAERS staff do not give medical advice. 6. How can I learn more?  Ask your health care provider.  Call your local or state health department.  
 Contact the Centers for Disease Control and Prevention (CDC): 
- Call 6-728.918.1450 (2-195-GGN-INFO) or 
- Visit CDCs website at www.cdc.gov/vaccines Vaccine Information Statement PPSV23  
10/30/2019 Department of Health and [employer] Centers for Disease Control and Prevention Office Use Only

## 2021-04-13 NOTE — PROGRESS NOTES
Chief Complaint   Patient presents with    Anxiety     1. Have you been to the ER, urgent care clinic since your last visit? Hospitalized since your last visit?no    2. Have you seen or consulted any other health care providers outside of the [address] [place] [address] since your last visit? Include any pap smears or colon screening.  no

## 2021-04-14 LAB
HCV AB S/CO SERPL IA: <0.1 S/CO RATIO (ref 0–0.9)
HCV AB SERPL QL IA: NORMAL

## 2021-07-06 ENCOUNTER — TELEPHONE (OUTPATIENT)
Dept: FAMILY MEDICINE CLINIC | Age: 26
End: 2021-07-06

## 2021-09-24 ENCOUNTER — OFFICE VISIT (OUTPATIENT)
Dept: CARDIOLOGY CLINIC | Age: 26
End: 2021-09-24
Payer: MEDICAID

## 2021-09-24 VITALS
RESPIRATION RATE: 18 BRPM | DIASTOLIC BLOOD PRESSURE: 60 MMHG | BODY MASS INDEX: 23.68 KG/M2 | SYSTOLIC BLOOD PRESSURE: 120 MMHG | OXYGEN SATURATION: 98 % | WEIGHT: 165.4 LBS | HEIGHT: 70 IN | HEART RATE: 56 BPM

## 2021-09-24 DIAGNOSIS — Q20.5 TRANSPOSITION OF GREAT VESSELS WITH VENTRICULAR INVERSION: Primary | ICD-10-CM

## 2021-09-24 PROCEDURE — 99213 OFFICE O/P EST LOW 20 MIN: CPT | Performed by: SPECIALIST

## 2021-09-24 NOTE — Clinical Note
9/24/2021    Patient: Sharlee Skiff   YOB: 1995   Date of Visit: 9/24/2021     Yeimy Sierra MD  [address]    Dear Yeimy Sierra MD,      Thank you for referring Mr. Sharlee Skiff to [address] [place] [address] for evaluation. My notes for this consultation are attached. If you have questions, please do not hesitate to call me. I look forward to following your patient along with you.       Sincerely,    Nicholas Sidhu MD

## 2021-09-24 NOTE — PROGRESS NOTES
Hien Allred     1995       Mindy Bustamante MD, [facility] [place]  Date of Visit-9/24/2021   PCP is Shyann Walden MD   [facility] [place]  [facility]  HPI:  Hien Allred is a 32 y.o. male   7 month f/u. Hx of TGA  Pt seen Dr. Rosibel Gaytan in 2018. 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 Today. .. Pt states that he has been exercising more, and has only had some minimal CP one week ago he believes to be MSK. He reports that he sprained his ankle and wrist recently. Pt reports that he hurt his ankle and wrist at an [employer] gym during boxing training. Denies edema, syncope or shortness of breath at rest, has no tachycardia, palpitations or sense of arrhythmia. Assessment/Plan:     1. 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 Echo in January was favorable. We plan to see him back in February with echo and EKG and then likely have an echo every two years or so. He had some CP that is atypical, has a murmur and click as described before. 01/14/21 ECHO ADULT COMPLETE Interpretation Summary  · D-TGA s/p Jatene arterial switch per records from previous MD  · LV: Estimated LVEF is 50 - 55%. Biplane method used to measure ejection fraction. Normal cavity size and systolic function (ejection fraction normal). Mild concentric hypertrophy. Abnormal left ventricular septal motion. Systolic flattening of the interventricular septum consistent with right ventricle pressure overload. No ventricular septal defect present in the left ventricle. · Pulmonary Artery: Mild pulmonary artery stenosis. PA best imaged from Right Parasternal window, in parallel and superior to Aorta. The best gradient across the PA was measured from a Subcostal long axis with anterior angling. Peak 17mmHg, mean [id number] arterial systolic pressure (PASP) is 32 mmHg.   · RV: Dilated right ventricle. · RA: Dilated right atrium. · IAS: No atrial septal defect present. · TV: Mild to moderate tricuspid valve regurgitation is present. · PV: Mild pulmonic valve regurgitation is present. · IVC: Moderately elevated central venous pressure (10-15 mmHg); IVC diameter is larger than 21 mm and collapses more than 50% with respiration. · Significant rib artifact in parasternal windows    Patient Instructions   We will see you back in 4 months with an echocardiogram one week prior to your office visit. Impression:   1. 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 Kin Vargas are available this is a note dated 8/2/2018. They note there is no history of sudden death in the family. Previous two-dimensional echo 8/2/2018 showed normal segmental abnormality anatomy with normal pulmonary vein and superior systemic venous connections there is trivial TR and trivial pulmonic insufficiency the pulmonary artery main pulmonary artery had a supravalvular suture line but no gradient was present borderline dilated aortic root excellent LV function. There was excellent biventricular function. Has trivial degree of supra valvar pulmonary stenosis and he gets an occasional palpitation recommend be seen every 2 years for EKG and echo. Hx  Scar tissue and urinary tract surgery 2018, impacted wisdom teeth 2016, heart surgery with TGA  transposition of greater vessels    Heart catheterization  at [address] non-smoker no alcohol once a week caffeine      Future Appointments   Date Time Provider Brendan William   1/27/2022 10:00 AM MARY BETH, [facility]   2/3/2022 10:00 AM Mindy Barnes MD [facility]        ROS-except as noted above. . A complete cardiac and respiratory are reviewed and negative except as above ; Resp-denies wheezing  or productive cough,.  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 He has never used smokeless tobacco. He reports that he does not drink alcohol and does not use drugs. Exam and Labs:  /60   Pulse (!) 56   Resp 18   Ht 5' 10\" (1.778 m)   Wt 165 lb 6.4 oz (75 kg)   SpO2 98%   BMI 23.73 kg/m² Constitutional:  NAD, comfortable  Head: NC,AT. Eyes: No scleral icterus. Neck:  Neck supple. No JVD present. Throat: moist mucous membranes. Chest: Effort normal & normal respiratory excursion . Neurological: alert, conversant and oriented . Skin: Skin is not cold. No obvious systemic rash noted. Not diaphoretic. No erythema. Psychiatric:  Grossly normal mood and affect. Behavior appears normal. Extremities:  no clubbing or cyanosis. Abdomen: non distended    Lungs:breath sounds normal. No stridor. distress, wheezes or  Rales. Heart:1-2/6 low pitched systolic murmur, and a mid-systolic click normal rate, regular rhythm, normal S1, S2, no rubs, clicks or gallops , PMI non displaced. Edema: Edema is none.   Lab Results   Component Value Date/Time    Cholesterol, total 131 01/05/2021 12:00 AM    HDL Cholesterol 54 01/05/2021 12:00 AM    LDL, calculated 43 01/05/2021 12:00 AM    LDL, calculated 43 10/02/2019 04:35 PM    Triglyceride 216 (H) 01/05/2021 12:00 AM     Lab Results   Component Value Date/Time    Sodium 140 01/05/2021 12:00 AM    Potassium 4.3 01/05/2021 12:00 AM    Chloride 106 01/05/2021 12:00 AM    CO2 22 01/05/2021 12:00 AM    Glucose 89 01/05/2021 12:00 AM    BUN 11 01/05/2021 12:00 AM    Creatinine 0.79 01/05/2021 12:00 AM    BUN/Creatinine ratio 14 01/05/2021 12:00 AM    GFR est  01/05/2021 12:00 AM    GFR est non- 01/05/2021 12:00 AM Calcium 9.4 01/05/2021 12:00 AM      Wt Readings from Last 3 Encounters:   09/24/21 165 lb 6.4 oz (75 kg)   04/13/21 160 lb (72.6 kg)   01/14/21 161 lb (73 kg)      BP Readings from Last 3 Encounters:   09/24/21 120/60   04/13/21 117/67   01/14/21 110/60      Current Outpatient Medications   Medication Sig    diclofenac (VOLTAREN) 1 % gel Apply 4 g to affected area four (4) times daily.  topiramate (TOPAMAX) 50 mg tablet TAKE 1 TABLET BY MOUTH TWICE A DAY FOR MIGRAINE PREVENTION    amoxicillin (AMOXIL) 500 mg capsule TAKE 4 CAPSULES BY MOUTH 1 HOUR PRIOR TO DENTAL APPOINTMENT     No current facility-administered medications for this visit. Impression see above.       Written by Roland Sicard, as dictated by Leonarda Mendoza MD.

## 2022-01-27 ENCOUNTER — TELEPHONE (OUTPATIENT)
Dept: CARDIOLOGY CLINIC | Age: 27
End: 2022-01-27

## 2022-03-18 PROBLEM — G43.919 INTRACTABLE MIGRAINE WITHOUT STATUS MIGRAINOSUS: Status: ACTIVE | Noted: 2017-03-01

## 2022-03-19 PROBLEM — F41.9 ANXIETY: Status: ACTIVE | Noted: 2017-03-01

## 2023-05-10 RX ORDER — AMOXICILLIN 500 MG/1
CAPSULE ORAL
COMMUNITY
Start: 2020-12-01

## 2023-05-10 RX ORDER — TOPIRAMATE 50 MG/1
TABLET, FILM COATED ORAL
COMMUNITY
Start: 2021-04-13

## 2023-06-23 ENCOUNTER — TELEPHONE (OUTPATIENT)
Age: 28
End: 2023-06-23

## 2023-06-23 NOTE — TELEPHONE ENCOUNTER
----- Message from Jorge Luis Santiago sent at 6/22/2023  3:38 PM EDT -----  Subject: Appointment Request    Reason for Call: Established Patient Appointment needed: Routine Physical   Exam    QUESTIONS    Reason for appointment request? No appointments available during search     Additional Information for Provider? patient would like annual wellness   physical for work with Dr. Lieutenant Bruce.   ---------------------------------------------------------------------------  --------------  [place] [facility]QW  [phone]; OK to leave message on voicemail  ---------------------------------------------------------------------------  --------------  SCRIPT ANSWERS

## 2023-06-23 NOTE — TELEPHONE ENCOUNTER
Sent pt a My Chart message on 6.23.23 informing him that 's next Annual Medicare Wellness (Physical) is not until February 2024.

## 2023-08-01 ENCOUNTER — TELEMEDICINE (OUTPATIENT)
Age: 28
End: 2023-08-01
Payer: COMMERCIAL

## 2023-08-01 DIAGNOSIS — G43.019 INTRACTABLE MIGRAINE WITHOUT AURA AND WITHOUT STATUS MIGRAINOSUS: Primary | ICD-10-CM

## 2023-08-01 DIAGNOSIS — E78.2 ELEVATED CHOLESTEROL WITH ELEVATED TRIGLYCERIDES: ICD-10-CM

## 2023-08-01 PROCEDURE — 99213 OFFICE O/P EST LOW 20 MIN: CPT | Performed by: FAMILY MEDICINE

## 2023-08-01 RX ORDER — TOPIRAMATE 50 MG/1
TABLET, FILM COATED ORAL
Qty: 60 TABLET | Refills: 5 | Status: SHIPPED | OUTPATIENT
Start: 2023-08-01

## 2023-08-01 ASSESSMENT — ENCOUNTER SYMPTOMS
COUGH: 0
NAUSEA: 0
BACK PAIN: 0
SHORTNESS OF BREATH: 0
VOMITING: 0

## 2023-08-01 ASSESSMENT — PATIENT HEALTH QUESTIONNAIRE - PHQ9
SUM OF ALL RESPONSES TO PHQ QUESTIONS 1-9: 0
2. FEELING DOWN, DEPRESSED OR HOPELESS: 0
SUM OF ALL RESPONSES TO PHQ QUESTIONS 1-9: 0
SUM OF ALL RESPONSES TO PHQ9 QUESTIONS 1 & 2: 0
1. LITTLE INTEREST OR PLEASURE IN DOING THINGS: 0
SUM OF ALL RESPONSES TO PHQ QUESTIONS 1-9: 0
SUM OF ALL RESPONSES TO PHQ QUESTIONS 1-9: 0

## 2023-08-01 NOTE — PROGRESS NOTES
Brody Hopkins is a 29 y.o. male who was seen by synchronous (real-time) audio-video technology on 8/1/2023. Consent:  Services were provided through a video synchronous discussion virtually to substitute for in-person appointment. He and/or his healthcare decision maker is aware that this patient-initiated Telehealth encounter is a billable service, with coverage as determined by his insurance carrier. He is aware that he may receive a bill and has provided verbal consent to proceed: Yes    I was in the office while conducting this encounter. Subjective:   Brody Hopkins was seen for follow up. He reports his mother set up this appointment. He states he is doing well. He has been working on gaining muscle mass. He has been monitoring his macro nutrients. Migraine: Pt reports if he exercises and eats a healthy diet he doesn't have any headaches. He hasn't needed to take Topamax recently. He would like to still keep some Topamax on hand to use if needed. Prior to Admission medications    Medication Sig Start Date End Date Taking?  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

## 2025-04-18 NOTE — PROGRESS NOTES
Anna Ahuja 30 y.o. male  presents to the office today Physcal exam    Blood pressure 108/63, pulse 76, temperature 98.5 °F (36.9 °C), temperature source Temporal, resp. rate 16, height 1.778 m (5' 10\"), weight 88.9 kg (196 lb), SpO2 97%. Body mass index is 28.12 kg/m².   Chief Complaint   Patient presents with    Knee Pain     Patient report b/l jose improving.  Patient report recently started working out again.  Patient reports concerns of running knee.  Multi-Injury to knee.        History of Present Illness  The patient presents for evaluation of anxiety, depression, left knee pain, and weight loss.    He began a more rigorous exercise regimen in 2022 but experienced a period of inactivity throughout 2023, leading to weight gain, peaking at approximately 190 pounds. He resumed his exercise routine, but the termination of a significant relationship at the start of 2025 resulted in severe depression and anxiety, manifesting as a complete loss of appetite. Despite recognizing the need to eat, hunger was absent, leading to a weight loss of about 15 pounds within a month. Elevated cortisol levels are suspected due to persistent high anxiety over several weeks. Sleep was disrupted by nightmares, and anxiety attacks occurred at work, leading to social withdrawal. This state persisted for approximately 3 to 4 weeks until he decided to seek help. Several contributing factors to his condition are identified, including rapid changes in personal and professional life and the end of his relationship. Inadequate psychological processing of these events negatively impacted his mental health. A significant moment of realization occurred upon noticing his weight loss and physical changes. Since then, efforts to improve health have included regular exercise and a calculated diet based on macros and calories. A referral for a therapist is requested.    During this period, an injury to the left knee was sustained, resulting in

## 2025-04-21 ENCOUNTER — OFFICE VISIT (OUTPATIENT)
Age: 30
End: 2025-04-21
Payer: COMMERCIAL

## 2025-04-21 VITALS
DIASTOLIC BLOOD PRESSURE: 63 MMHG | HEART RATE: 76 BPM | RESPIRATION RATE: 16 BRPM | TEMPERATURE: 98.5 F | OXYGEN SATURATION: 97 % | HEIGHT: 70 IN | SYSTOLIC BLOOD PRESSURE: 108 MMHG | BODY MASS INDEX: 28.06 KG/M2 | WEIGHT: 196 LBS

## 2025-04-21 DIAGNOSIS — E78.2 ELEVATED CHOLESTEROL WITH ELEVATED TRIGLYCERIDES: ICD-10-CM

## 2025-04-21 DIAGNOSIS — F41.9 ANXIETY: Primary | ICD-10-CM

## 2025-04-21 DIAGNOSIS — G89.29 CHRONIC PAIN OF LEFT KNEE: ICD-10-CM

## 2025-04-21 DIAGNOSIS — M25.562 CHRONIC PAIN OF LEFT KNEE: ICD-10-CM

## 2025-04-21 DIAGNOSIS — Z11.4 SCREENING FOR HIV WITHOUT PRESENCE OF RISK FACTORS: ICD-10-CM

## 2025-04-21 DIAGNOSIS — Z00.00 PHYSICAL EXAM: ICD-10-CM

## 2025-04-21 DIAGNOSIS — E66.3 OVERWEIGHT: ICD-10-CM

## 2025-04-21 PROCEDURE — 99395 PREV VISIT EST AGE 18-39: CPT | Performed by: FAMILY MEDICINE

## 2025-04-21 PROCEDURE — 99214 OFFICE O/P EST MOD 30 MIN: CPT | Performed by: FAMILY MEDICINE

## 2025-04-21 SDOH — ECONOMIC STABILITY: FOOD INSECURITY: WITHIN THE PAST 12 MONTHS, YOU WORRIED THAT YOUR FOOD WOULD RUN OUT BEFORE YOU GOT MONEY TO BUY MORE.: NEVER TRUE

## 2025-04-21 SDOH — ECONOMIC STABILITY: FOOD INSECURITY: WITHIN THE PAST 12 MONTHS, THE FOOD YOU BOUGHT JUST DIDN'T LAST AND YOU DIDN'T HAVE MONEY TO GET MORE.: NEVER TRUE

## 2025-04-21 ASSESSMENT — PATIENT HEALTH QUESTIONNAIRE - PHQ9
SUM OF ALL RESPONSES TO PHQ QUESTIONS 1-9: 2
SUM OF ALL RESPONSES TO PHQ QUESTIONS 1-9: 2
1. LITTLE INTEREST OR PLEASURE IN DOING THINGS: SEVERAL DAYS
SUM OF ALL RESPONSES TO PHQ QUESTIONS 1-9: 2
2. FEELING DOWN, DEPRESSED OR HOPELESS: SEVERAL DAYS
SUM OF ALL RESPONSES TO PHQ QUESTIONS 1-9: 2

## 2025-04-21 NOTE — PROGRESS NOTES
1. Have you been to the ER, urgent care clinic since your last visit?  Hospitalized since your last visit?No    2. Have you seen or consulted any other health care providers outside of the [facility] since your last visit?  Include any pap smears or colon screening. No    Chief Complaint   Patient presents with    Knee Pain     Patient report b/l jose improving.  Patient report recently started working out again.  Patient reports concerns of running knee.  Multi-Injury to knee.     Health Maintenance Due   Topic Date Due    Varicella vaccine (2 of 2 - 2-dose childhood series) 04/02/1999    HIV screen  Never done    Depression Screen  08/01/2024    COVID-19 Vaccine (1 - 2024-25 season) Never done     PHQ-9 Total Score: 2 (4/21/2025  3:01 PM)        4/21/2025     3:00 PM   AMB Abuse Screening   Do you ever feel afraid of your partner? N   Are you in a relationship with someone who physically or mentally threatens you? N   Is it safe for you to go home? Y     Vitals:    04/21/25 1501   BP: 108/63   Pulse: 76   Resp: 16   Temp: 98.5 °F (36.9 °C)   SpO2: 97%

## 2025-04-22 LAB
25(OH)D3 SERPL-MCNC: 57.4 NG/ML (ref 30–100)
ALBUMIN SERPL-MCNC: 3.8 G/DL (ref 3.5–5)
ALBUMIN/GLOB SERPL: 1.1 (ref 1.1–2.2)
ALP SERPL-CCNC: 80 U/L (ref 45–117)
ALT SERPL-CCNC: 53 U/L (ref 12–78)
ANION GAP SERPL CALC-SCNC: 2 MMOL/L (ref 2–12)
APPEARANCE UR: CLEAR
AST SERPL-CCNC: 52 U/L (ref 15–37)
BACTERIA URNS QL MICRO: NEGATIVE /HPF
BASOPHILS # BLD: 0.07 K/UL (ref 0–0.1)
BASOPHILS NFR BLD: 1.1 % (ref 0–1)
BILIRUB SERPL-MCNC: 0.2 MG/DL (ref 0.2–1)
BILIRUB UR QL: NEGATIVE
BUN SERPL-MCNC: 21 MG/DL (ref 6–20)
BUN/CREAT SERPL: 20 (ref 12–20)
CALCIUM SERPL-MCNC: 9.6 MG/DL (ref 8.5–10.1)
CHLORIDE SERPL-SCNC: 108 MMOL/L (ref 97–108)
CHOLEST SERPL-MCNC: 125 MG/DL
CO2 SERPL-SCNC: 26 MMOL/L (ref 21–32)
COLOR UR: NORMAL
CREAT SERPL-MCNC: 1.03 MG/DL (ref 0.7–1.3)
DIFFERENTIAL METHOD BLD: ABNORMAL
EOSINOPHIL # BLD: 0.23 K/UL (ref 0–0.4)
EOSINOPHIL NFR BLD: 3.5 % (ref 0–7)
EPITH CASTS URNS QL MICRO: NORMAL /LPF
ERYTHROCYTE [DISTWIDTH] IN BLOOD BY AUTOMATED COUNT: 13 % (ref 11.5–14.5)
GLOBULIN SER CALC-MCNC: 3.4 G/DL (ref 2–4)
GLUCOSE SERPL-MCNC: 101 MG/DL (ref 65–100)
GLUCOSE UR STRIP.AUTO-MCNC: NEGATIVE MG/DL
HCT VFR BLD AUTO: 41.7 % (ref 36.6–50.3)
HDLC SERPL-MCNC: 38 MG/DL
HDLC SERPL: 3.3 (ref 0–5)
HGB BLD-MCNC: 13.7 G/DL (ref 12.1–17)
HGB UR QL STRIP: NEGATIVE
HIV 1+2 AB+HIV1 P24 AG SERPL QL IA: NONREACTIVE
HIV 1/2 RESULT COMMENT: NORMAL
HYALINE CASTS URNS QL MICRO: NORMAL /LPF (ref 0–5)
IMM GRANULOCYTES # BLD AUTO: 0.02 K/UL (ref 0–0.04)
IMM GRANULOCYTES NFR BLD AUTO: 0.3 % (ref 0–0.5)
KETONES UR QL STRIP.AUTO: NEGATIVE MG/DL
LDLC SERPL CALC-MCNC: 36 MG/DL (ref 0–100)
LEUKOCYTE ESTERASE UR QL STRIP.AUTO: NEGATIVE
LYMPHOCYTES # BLD: 1.84 K/UL (ref 0.8–3.5)
LYMPHOCYTES NFR BLD: 27.9 % (ref 12–49)
MCH RBC QN AUTO: 30 PG (ref 26–34)
MCHC RBC AUTO-ENTMCNC: 32.9 G/DL (ref 30–36.5)
MCV RBC AUTO: 91.2 FL (ref 80–99)
MONOCYTES # BLD: 0.46 K/UL (ref 0–1)
MONOCYTES NFR BLD: 7 % (ref 5–13)
NEUTS SEG # BLD: 3.98 K/UL (ref 1.8–8)
NEUTS SEG NFR BLD: 60.2 % (ref 32–75)
NITRITE UR QL STRIP.AUTO: NEGATIVE
NRBC # BLD: 0 K/UL (ref 0–0.01)
NRBC BLD-RTO: 0 PER 100 WBC
PH UR STRIP: 6.5 (ref 5–8)
PLATELET # BLD AUTO: 316 K/UL (ref 150–400)
PMV BLD AUTO: 9.4 FL (ref 8.9–12.9)
POTASSIUM SERPL-SCNC: 4.5 MMOL/L (ref 3.5–5.1)
PROT SERPL-MCNC: 7.2 G/DL (ref 6.4–8.2)
PROT UR STRIP-MCNC: NEGATIVE MG/DL
RBC # BLD AUTO: 4.57 M/UL (ref 4.1–5.7)
RBC #/AREA URNS HPF: NORMAL /HPF (ref 0–5)
SODIUM SERPL-SCNC: 136 MMOL/L (ref 136–145)
SP GR UR REFRACTOMETRY: 1.02 (ref 1–1.03)
TRIGL SERPL-MCNC: 255 MG/DL
TSH SERPL DL<=0.05 MIU/L-ACNC: 2.78 UIU/ML (ref 0.36–3.74)
URINE CULTURE IF INDICATED: NORMAL
UROBILINOGEN UR QL STRIP.AUTO: 0.2 EU/DL (ref 0.2–1)
VLDLC SERPL CALC-MCNC: 51 MG/DL
WBC # BLD AUTO: 6.6 K/UL (ref 4.1–11.1)
WBC URNS QL MICRO: NORMAL /HPF (ref 0–4)

## 2025-04-23 LAB — VZV IGG SER IA-ACNC: NON REACTIVE

## 2025-05-25 ENCOUNTER — RESULTS FOLLOW-UP (OUTPATIENT)
Age: 30
End: 2025-05-25

## 2025-05-25 DIAGNOSIS — Z00.00 PHYSICAL EXAM: Primary | ICD-10-CM

## 2025-06-26 ENCOUNTER — LAB (OUTPATIENT)
Age: 30
End: 2025-06-26

## 2025-06-26 DIAGNOSIS — Z00.00 PHYSICAL EXAM: ICD-10-CM

## 2025-06-27 ENCOUNTER — RESULTS FOLLOW-UP (OUTPATIENT)
Age: 30
End: 2025-06-27

## 2025-06-27 DIAGNOSIS — R74.8 ELEVATED LIVER ENZYMES: Primary | ICD-10-CM

## 2025-06-27 LAB
ALBUMIN SERPL-MCNC: 3.9 G/DL (ref 3.5–5)
ALBUMIN/GLOB SERPL: 1.1 (ref 1.1–2.2)
ALP SERPL-CCNC: 76 U/L (ref 45–117)
ALT SERPL-CCNC: 74 U/L (ref 12–78)
AST SERPL-CCNC: 87 U/L (ref 15–37)
BILIRUB DIRECT SERPL-MCNC: <0.1 MG/DL (ref 0–0.2)
BILIRUB SERPL-MCNC: 0.2 MG/DL (ref 0.2–1)
GLOBULIN SER CALC-MCNC: 3.4 G/DL (ref 2–4)
PROT SERPL-MCNC: 7.3 G/DL (ref 6.4–8.2)

## 2025-07-17 ENCOUNTER — HOSPITAL ENCOUNTER (OUTPATIENT)
Facility: HOSPITAL | Age: 30
Discharge: HOME | End: 2025-07-20
Attending: FAMILY MEDICINE
Payer: COMMERCIAL

## 2025-07-17 DIAGNOSIS — R74.8 ELEVATED LIVER ENZYMES: ICD-10-CM

## 2025-07-17 PROCEDURE — 76705 ECHO EXAM OF ABDOMEN: CPT
